# Patient Record
Sex: MALE | URBAN - METROPOLITAN AREA
[De-identification: names, ages, dates, MRNs, and addresses within clinical notes are randomized per-mention and may not be internally consistent; named-entity substitution may affect disease eponyms.]

---

## 2017-03-08 ENCOUNTER — TRANSFERRED RECORDS (OUTPATIENT)
Dept: HEALTH INFORMATION MANAGEMENT | Facility: CLINIC | Age: 57
End: 2017-03-08

## 2017-03-09 ENCOUNTER — HOSPITAL ENCOUNTER (OUTPATIENT)
Dept: MRI IMAGING | Facility: CLINIC | Age: 57
Discharge: HOME | End: 2017-03-09
Attending: FAMILY MEDICINE | Admitting: FAMILY MEDICINE
Payer: COMMERCIAL

## 2017-03-09 DIAGNOSIS — M25.461 EFFUSION, RIGHT KNEE: ICD-10-CM

## 2017-03-09 DIAGNOSIS — M25.561 RIGHT KNEE PAIN, UNSPECIFIED CHRONICITY: ICD-10-CM

## 2017-03-09 PROCEDURE — 73721 MRI JNT OF LWR EXTRE W/O DYE: CPT | Mod: RT

## 2017-03-13 ENCOUNTER — OFFICE VISIT (OUTPATIENT)
Dept: ORTHOPEDICS | Facility: CLINIC | Age: 57
End: 2017-03-13

## 2017-03-13 VITALS — BODY MASS INDEX: 28.71 KG/M2 | HEIGHT: 72 IN | WEIGHT: 212 LBS

## 2017-03-13 DIAGNOSIS — M25.561 ARTHRALGIA OF RIGHT LOWER LEG: Primary | ICD-10-CM

## 2017-03-13 RX ORDER — DICLOFENAC SODIUM 75 MG/1
75 TABLET, DELAYED RELEASE ORAL 2 TIMES DAILY PRN
Qty: 30 TABLET | Refills: 1 | Status: SHIPPED | OUTPATIENT
Start: 2017-03-13 | End: 2018-02-17

## 2017-03-13 NOTE — MR AVS SNAPSHOT
After Visit Summary   3/13/2017    Jose Antonio Brown    MRN: [id number]           Patient Information     Date Of Birth          1960        Visit Information        Provider Department      3/13/2017 8:30 AM Owen Malhotra MD [facility]        Today's Diagnoses     Arthralgia of right lower leg    -  1       Follow-ups after your visit        Your next 10 appointments already scheduled     Mar 27, 2017  8:50 AM CDT   (Arrive by 8:35 AM)   Return Visit with Owen Malhotra MD   [facility] ([facility] [place])    [address] [place] [address] [phone]              Who to contact     Please call your clinic at [phone] to:    Ask questions about your health    Make or cancel appointments    Discuss your medicines    Learn about your test results    Speak to your doctor   If you have compliments or concerns about an experience at your clinic, or if you wish to file a complaint, please contact [facility] Patient Relations at [phone] or email us at [email or web address]         Additional Information About Your Visit        MyChart Information     Nutrisystemt is an electronic gateway that provides easy, online access to your medical records. With Puuilo, you can request a clinic appointment, read your test results, renew a prescription or communicate with your care team.     To sign up for Nutrisystemt visit the website at [email or web address]   You will be asked to enter the access code listed below, as well as some personal information. Please follow the directions to create your username and password.     Your access code is: [email or web address]  Expires: 2017  9:28 AM     Your access code will  in 90 days. If you need help or a new code, please contact your [facility] or call [phone] for assistance.        Care EveryWhere ID      This is your Care EveryWhere ID. This could be used by other organizations to access your [facility] medical records  [id number]        Your Vitals Were     Height BMI (Body Mass Index)                6' (1.829 m) 28.75 kg/m2           Blood Pressure from Last 3 Encounters:   No data found for BP    Weight from Last 3 Encounters:   03/13/17 212 lb (96.2 kg)              Today, you had the following     No orders found for display         Today's Medication Changes          These changes are accurate as of: 3/13/17  9:28 AM.  If you have any questions, ask your nurse or doctor.               Start taking these medicines.        Dose/Directions    diclofenac 75 MG EC tablet   Commonly known as:  VOLTAREN   Used for:  Arthralgia of right lower leg   Started by:  Owen Malhotra MD        Dose:  75 mg   Take 1 tablet (75 mg) by mouth 2 times daily as needed for moderate pain   Quantity:  30 tablet   Refills:  1            Where to get your medicines      Some of these will need a paper prescription and others can be bought over the counter.  Ask your nurse if you have questions.     Bring a paper prescription for each of these medications     diclofenac 75 MG EC tablet                Primary Care Provider    None Specified       No primary provider on file.        Thank you!     Thank you for choosing [facility]  for your care. Our goal is always to provide you with excellent care. Hearing back from our patients is one way we can continue to improve our services. Please take a few minutes to complete the written survey that you may receive in the mail after your visit with us. Thank you!             Your Updated Medication List - Protect others around you: Learn how to safely use, store and throw away your medicines at www.disposemymeds.org.          This list is accurate as of: 3/13/17  9:28 AM.  Always use your most recent med list.                   Brand Name Dispense Instructions for use     diclofenac 75 MG EC tablet    VOLTAREN    30 tablet    Take 1 tablet (75 mg) by mouth 2 times daily as needed for moderate pain

## 2017-03-13 NOTE — PROGRESS NOTES
HISTORY OF PRESENT ILLNESS:  This is a 56-year-old male who is in today for an evaluation of his right knee.  The patient states that he started noticing right knee discomfort about a week and a half ago with no known mechanism.  He works as a .  He denies significant swelling.  States that it may have a little bit of swelling.  He denies any locking, catching or christiane giving way.  The patient's intake form is reviewed and appears to be accurate.  Signed off on and scanned in.        PHYSICAL EXAMINATION:  The patient's physical examination demonstrates that he has a right knee range of motion 0-140.  He has pain with hyperflexion.  Most of his pain is localized to the medial aspect knee.  He has stable varus and valgus stress at 0 and 30 degrees.  Negative anterior and posterior drawer.  Negative Lachman.  Negative pivot shift.  He has positive medial joint line tenderness.  No lateral joint line tenderness.  He is neurovascularly intact.  Pain with attempt at Mariah medially.  There is just a trace effusion.      IMAGING:  The patient is status post an MRI.  The patient's MRI demonstrates that he has intact ACL, PCL, MCL and posterolateral corner.  He does demonstrate a small right knee effusion.  He has a tendinosis of his right knee patellar tendon although he is not tender there today.  The patient has a horizontal signal within his right knee, medial meniscus with extension to the inferior articular surface at the level of the posterior horn.  No displaced fragment.  Mild free edge fraying of his lateral meniscus.  The patient's findings are reviewed with him in some detail.      ASSESSMENT:  My overall impression is that the patient has right knee pain and a medial meniscus tear.  He has had about a week and a half of symptoms.      PLAN:  At this point, I think it may be a little early to suggest a surgical intervention.  We talked about conservative management using an anti-inflammatory and he  will give this a try over the next 2 weeks and follow up with me at that time.  If he is still having problems at that time, consider arthroscopic intervention and possible partial meniscectomy versus repair.        This patient's office visit is 30 minutes of face-to-face time, of which 20 minutes was counseling.

## 2017-03-13 NOTE — LETTER
3/13/2017      RE: Jose Antonio Brown  [address]  [facility] [address]       HISTORY OF PRESENT ILLNESS:  This is a 56-year-old male who is in today for an evaluation of his right knee.  The patient states that he started noticing right knee discomfort about a week and a half ago with no known mechanism.  He works as a .  He denies significant swelling.  States that it may have a little bit of swelling.  He denies any locking, catching or christiane giving way.  The patient's intake form is reviewed and appears to be accurate.  Signed off on and scanned in.        PHYSICAL EXAMINATION:  The patient's physical examination demonstrates that he has a right knee range of motion 0-140.  He has pain with hyperflexion.  Most of his pain is localized to the medial aspect knee.  He has stable varus and valgus stress at 0 and 30 degrees.  Negative anterior and posterior drawer.  Negative Lachman.  Negative pivot shift.  He has positive medial joint line tenderness.  No lateral joint line tenderness.  He is neurovascularly intact.  Pain with attempt at Mariah medially.  There is just a trace effusion.      IMAGING:  The patient is status post an MRI.  The patient's MRI demonstrates that he has intact ACL, PCL, MCL and posterolateral corner.  He does demonstrate a small right knee effusion.  He has a tendinosis of his right knee patellar tendon although he is not tender there today.  The patient has a horizontal signal within his right knee, medial meniscus with extension to the inferior articular surface at the level of the posterior horn.  No displaced fragment.  Mild free edge fraying of his lateral meniscus.  The patient's findings are reviewed with him in some detail.      ASSESSMENT:  My overall impression is that the patient has right knee pain and a medial meniscus tear.  He has had about a week and a half of symptoms.      PLAN:  At this point, I think it may be a little early to suggest a  surgical intervention.  We talked about conservative management using an anti-inflammatory and he will give this a try over the next 2 weeks and follow up with me at that time.  If he is still having problems at that time, consider arthroscopic intervention and possible partial meniscectomy versus repair.        This patient's office visit is 30 minutes of face-to-face time, of which 20 minutes was counseling.     Owen Malhotra MD

## 2017-03-13 NOTE — NURSING NOTE
Reason For Visit:   Chief Complaint   Patient presents with     Consult     right knee injury       ?  No  Occupation .  Currently working? Yes.  Work status?  Full time.  Date of injury: started noticing pain about a week and a half ago, no known mechanism  Type of injury: NA.  Date of surgery: NA  Type of surgery: NA.  Smoker: No  Request smoking cessation information: No    No current outpatient prescriptions on file.     No current facility-administered medications for this visit.       No Known Allergies    Ht 6' (1.829 m)  Wt 212 lb (96.2 kg)  BMI 28.75 kg/m2    Ramonita Yusuf, ATC

## 2017-03-27 ENCOUNTER — OFFICE VISIT (OUTPATIENT)
Dept: ORTHOPEDICS | Facility: CLINIC | Age: 57
End: 2017-03-27

## 2017-03-27 VITALS — WEIGHT: 212 LBS | HEIGHT: 72 IN | BODY MASS INDEX: 28.71 KG/M2

## 2017-03-27 DIAGNOSIS — S83.241A ACUTE MEDIAL MENISCUS TEAR, RIGHT, INITIAL ENCOUNTER: Primary | ICD-10-CM

## 2017-03-27 NOTE — LETTER
3/27/2017      RE: Jose Antonio Borwn  [address]  [facility] [address]       HISTORY OF PRESENT ILLNESS:  He was last seen on 3/13/17 for right knee pain diagnosed as having a right knee medial meniscus tear.  He had an MRI which showed a little undersurface horizontal tear.  The rest of his joint actually was in very good shape.  No chondral surface injury.  No ligamentous injuries.  He has just started having right knee discomfort.  It was mostly medial side at that time.  We decided to treat him conservatively initially given that it had only been giving him trouble for about 2 weeks and placed him on an anti-inflammatory, Voltaren, had him continue his physical therapy and we would see him back today.  On his visit today the patient states that he actually feels pretty good.  He reports the pain as being a 1/10.  He is able do his daily activities without any problems.  He denies having recurrent swelling, no locking, catching or christiane giving way.  He admits that he is not doing any lower extremity stuff at the gym at this time.      PHYSICAL EXAMINATION:  He has full range of motion of his knee 0-140 with no pain with hyperflexion.  He is stable to varus and valgus stress at 0 and 30 degrees.  Negative anterior and posterior drawer, negative Lachman, negative pivot shift.  He has no lateral joint line tenderness and no medial joint line tenderness.  No tenderness over his medial hamstring at the time either.  He has no patellofemoral symptoms.  Negative patellar tilt.  Negative patellar glide.  He is otherwise neurovascularly intact.      IMPRESSION:  The impression is that the patient has right knee pain.  He has a medial meniscus tear.  It sounds at this point that things are stable.  Certainly he needs to increase his activity level to be sure that things are stable.  The recommendation is that he increases his activity level at the gym and he can start doing lower extremity work.  Should  symptoms return or he start having medial-sided knee pain and it appears that the meniscus is unstable then he is a candidate for a right knee arthroscopy.  We talked about this in some detail.  The risks and benefits of the procedure as well as rehabilitation protocol.  Should he start to have symptoms, he will give us a call and figure out a time a day for his arthroscopy as needed.     Owen Malhotra MD

## 2017-03-27 NOTE — NURSING NOTE
Reason For Visit:   Chief Complaint   Patient presents with     RECHECK     Follow up on right knee meniscus tear         Pain Assessment  Patient Currently in Pain: Yes  0-10 Pain Scale: 1  Pain Orientation: Right    Current Outpatient Prescriptions   Medication     diclofenac (VOLTAREN) 75 MG EC tablet     No current facility-administered medications for this visit.       No Known Allergies    Ramonita Yusuf, ATC

## 2017-03-27 NOTE — MR AVS SNAPSHOT
After Visit Summary   3/27/2017    Jose Antonio Brown    MRN: [id number]           Patient Information     Date Of Birth          1960        Visit Information        Provider Department      3/27/2017 9:20 AM Owen Malhotra MD [facility]        Today's Diagnoses     Acute medial meniscus tear, right, initial encounter    -  1       Follow-ups after your visit        Your next 10 appointments already scheduled     Apr 10, 2017  9:20 AM CDT   (Arrive by 9:05 AM)   Return Visit with Owen Malhotra MD   [facility] ([facility] [place])    [address] [place] [address]  [facility] [address] [phone]              Who to contact     Please call your clinic at [phone] to:    Ask questions about your health    Make or cancel appointments    Discuss your medicines    Learn about your test results    Speak to your doctor   If you have compliments or concerns about an experience at your clinic, or if you wish to file a complaint, please contact [facility] Patient Relations at [phone] or email us at [email or web address]         Additional Information About Your Visit        MyChart Information     Ubiquitous Energ[employer] is an electronic gateway that provides easy, online access to your medical records. With Schoology, you can request a clinic appointment, read your test results, renew a prescription or communicate with your care team.     To sign up for Ubiquitous Energyt visit the website at [email or web address]   You will be asked to enter the access code listed below, as well as some personal information. Please follow the directions to create your username and password.     Your access code is: [email or web address]  Expires: 2017  9:28 AM     Your access code will  in 90 days. If you need help or a new code, please contact your [facility] or call [phone] for assistance.         Care EveryWhere ID     This is your Care EveryWhere ID. This could be used by other organizations to access your [place] medical records  [id number]        Your Vitals Were     Height BMI (Body Mass Index)                1.829 m (6') 28.75 kg/m2           Blood Pressure from Last 3 Encounters:   No data found for BP    Weight from Last 3 Encounters:   03/27/17 96.2 kg (212 lb)   03/13/17 96.2 kg (212 lb)              Today, you had the following     No orders found for display       Primary Care Provider    None Specified       No primary provider on file.        Thank you!     Thank you for choosing [facility]  for your care. Our goal is always to provide you with excellent care. Hearing back from our patients is one way we can continue to improve our services. Please take a few minutes to complete the written survey that you may receive in the mail after your visit with us. Thank you!             Your Updated Medication List - Protect others around you: Learn how to safely use, store and throw away your medicines at www.disposemymeds.org.          This list is accurate as of: 3/27/17 11:59 PM.  Always use your most recent med list.                   Brand Name Dispense Instructions for use    diclofenac 75 MG EC tablet    VOLTAREN    30 tablet    Take 1 tablet (75 mg) by mouth 2 times daily as needed for moderate pain

## 2017-03-27 NOTE — PROGRESS NOTES
HISTORY OF PRESENT ILLNESS:  He was last seen on 3/13/17 for right knee pain diagnosed as having a right knee medial meniscus tear.  He had an MRI which showed a little undersurface horizontal tear.  The rest of his joint actually was in very good shape.  No chondral surface injury.  No ligamentous injuries.  He has just started having right knee discomfort.  It was mostly medial side at that time.  We decided to treat him conservatively initially given that it had only been giving him trouble for about 2 weeks and placed him on an anti-inflammatory, Voltaren, had him continue his physical therapy and we would see him back today.  On his visit today the patient states that he actually feels pretty good.  He reports the pain as being a 1/10.  He is able do his daily activities without any problems.  He denies having recurrent swelling, no locking, catching or christiane giving way.  He admits that he is not doing any lower extremity stuff at the gym at this time.      PHYSICAL EXAMINATION:  He has full range of motion of his knee 0-140 with no pain with hyperflexion.  He is stable to varus and valgus stress at 0 and 30 degrees.  Negative anterior and posterior drawer, negative Lachman, negative pivot shift.  He has no lateral joint line tenderness and no medial joint line tenderness.  No tenderness over his medial hamstring at the time either.  He has no patellofemoral symptoms.  Negative patellar tilt.  Negative patellar glide.  He is otherwise neurovascularly intact.      IMPRESSION:  The impression is that the patient has right knee pain.  He has a medial meniscus tear.  It sounds at this point that things are stable.  Certainly he needs to increase his activity level to be sure that things are stable.  The recommendation is that he increases his activity level at the gym and he can start doing lower extremity work.  Should symptoms return or he start having medial-sided knee pain and it appears that the meniscus is  unstable then he is a candidate for a right knee arthroscopy.  We talked about this in some detail.  The risks and benefits of the procedure as well as rehabilitation protocol.  Should he start to have symptoms, he will give us a call and figure out a time a day for his arthroscopy as needed.

## 2017-12-05 ENCOUNTER — OFFICE VISIT (OUTPATIENT)
Dept: ORTHOPEDICS | Facility: CLINIC | Age: 57
End: 2017-12-05

## 2017-12-05 VITALS
DIASTOLIC BLOOD PRESSURE: 75 MMHG | WEIGHT: 209.4 LBS | SYSTOLIC BLOOD PRESSURE: 129 MMHG | BODY MASS INDEX: 28.36 KG/M2 | HEIGHT: 72 IN | HEART RATE: 59 BPM

## 2017-12-05 DIAGNOSIS — M25.562 ACUTE PAIN OF LEFT KNEE: ICD-10-CM

## 2017-12-05 DIAGNOSIS — M25.462 KNEE EFFUSION, LEFT: Primary | ICD-10-CM

## 2017-12-05 RX ORDER — DICLOFENAC SODIUM 75 MG/1
75 TABLET, DELAYED RELEASE ORAL 2 TIMES DAILY
Qty: 14 TABLET | Refills: 0 | Status: SHIPPED | OUTPATIENT
Start: 2017-12-05 | End: 2018-02-17

## 2017-12-05 NOTE — MR AVS SNAPSHOT
After Visit Summary   12/5/2017    Jose Antonio Brown    MRN: [id number]           Patient Information     Date Of Birth          1960        Visit Information        Provider Department      12/5/2017 12:50 PM Natalio Hall [facility]        Today's Diagnoses     Knee effusion, left    -  1      Care Instructions    Knee Exercises (Meniscal tear)    You may do the first 7 exercises right away. You may do the rest of the exercises when the pain in your knee has decreased.    Passive knee extension: Do this exercise if you are unable to extend your knee fully. While lying on your back, place a rolled-up towel under the heel of your injured leg so the heel is about 6 inches off the ground. Relax your leg muscles and let gravity slowly straighten your knee. Try to hold this position for 2 minutes. Repeat 3 times. You may feel some discomfort while doing this exercise. Do the exercise several times a day.  This exercise can also be done while sitting in a chair with your heel on another chair or stool.    Heel slide: Sit on a firm surface with your legs straight in front of you. Slowly slide the heel of the foot on your injured side toward your buttock by pulling your knee toward your chest as you slide the heel. Return to the starting position. Do 2 sets of 15.  Standing calf stretch: Stand facing a wall with your hands on the wall at about eye level. Keep your injured leg back with your heel on the floor. Keep the other leg forward with the knee bent. Turn your back foot slightly inward (as if you were pigeon-toed). Slowly lean into the wall until you feel a stretch in the back of your calf. Hold the stretch for 15 to 30 seconds. Return to the starting position. Repeat 3 times. Do this exercise several times each day.    Hamstring stretch on wall: Lie on your back with your buttocks close to a doorway. Stretch your uninjured leg straight out in front of you on  the floor through the doorway. Raise your injured leg and rest it against the wall next to the door frame. Keep your leg as straight as possible. You should feel a stretch in the back of your thigh. Hold this position for 15 to 30 seconds. Repeat 3 times.  Straight leg raise: Lie on your back with your legs straight out in front of you. Bend the knee on your uninjured side and place the foot flat on the floor. Tighten the thigh muscle on your injured side and lift your leg about 8 inches off the floor. Keep your leg straight and your thigh muscle tight. Slowly lower your leg back down to the floor. Do 2 sets of 15.    Prone hip extension: Lie on your stomach with your legs straight out behind you. Fold your arms under your head and rest your head on your arms. Draw your belly button in towards your spine and tighten your abdominal muscles. Tighten the buttocks and thigh muscles of the leg on your injured side and lift the leg off the floor about 8 inches. Keep your leg straight. Hold for 5 seconds. Then lower your leg and relax. Do 2 sets of 15.  Clam exercise: Lie on your uninjured side with your hips and knees bent and feet together. Slowly raise your top leg toward the ceiling while keeping your heels touching each other. Hold for 2 seconds and lower slowly. Do 2 sets of 15 repetitions.    Wall squat with a ball: Stand with your back, shoulders, and head against a wall. Look straight ahead. Keep your shoulders relaxed and your feet 3 feet (90 centimeters) from the wall and shoulder's width apart. Place a soccer or basketball-sized ball behind your back. Keeping your back against the wall, slowly squat down to a 45-degree angle. Your thighs will not yet be parallel to the floor. Hold this position for 10 seconds and then slowly slide back up the wall. Repeat 10 times. Build up to 2 sets of 15.  Step-up: Stand with the foot of your injured leg on a support 3 to 5 inches (8 to 13 centimeters) high --like a small  step or block of wood. Keep your other foot flat on the floor. Shift your weight onto the injured leg on the support. Straighten your injured leg as the other leg comes off the floor. Return to the starting position by bending your injured leg and slowly lowering your uninjured leg back to the floor. Do 2 sets of 15.    Knee stabilization: Wrap a piece of elastic tubing around the ankle of your uninjured leg. Tie a knot in the other end of the tubing and close it in a door at about ankle height.  Stand facing the door on the leg without tubing (your injured leg) and bend your knee slightly, keeping your thigh muscles tight. Stay in this position while you move the leg with the tubing (the uninjured leg) straight back behind you. Do 2 sets of 15.  Turn 90 degrees so the leg without tubing is closest to the door. Move the leg with tubing away from your body. Do 2 sets of 15.  Turn 90 degrees again so your back is to the door. Move the leg with tubing straight out in front of you. Do 2 sets of 15.  Turn your body 90 degrees again so the leg with tubing is closest to the door. Move the leg with tubing across your body. Do 2 sets of 15.  Hold onto a chair if you need help balancing. This exercise can be made more challenging by standing on a firm pillow or foam mat while you move the leg with tubing.    Resisted terminal knee extension: Make a loop with a piece of elastic tubing by tying a knot in both ends. Close the knot in a door at knee height. Step into the loop with your injured leg so the tubing is around the back of your knee. Lift the other foot off the ground and hold onto a chair for balance, if needed. Bend the knee with tubing about 45 degrees. Slowly straighten your leg, keeping your thigh muscle tight as you do this. Repeat 15 times. Do 2 sets of 15. If you need an easier way to do this, stand on both legs for better support while you do the exercise.  If you have access to a [employer] board, do the following  exercises:            Developed by [employer].  Published by [employer].  Copyright  2014 [employer] and/or one of its subsidiaries. All rights reserved.          Follow-ups after your visit        Who to contact     Please call your clinic at [phone] to:    Ask questions about your health    Make or cancel appointments    Discuss your medicines    Learn about your test results    Speak to your doctor   If you have compliments or concerns about an experience at your clinic, or if you wish to file a complaint, please contact [facility] Patient Relations at [phone] or email us at [email or web address]         Additional Information About Your Visit        [employer] Information     tracx is an electronic gateway that provides easy, online access to your medical records. With tracx, you can request a clinic appointment, read your test results, renew a prescription or communicate with your care team.     To sign up for tracx visit the website at [email or web address]   You will be asked to enter the access code listed below, as well as some personal information. Please follow the directions to create your username and password.     Your access code is: [email or web address]  Expires: 3/5/2018  2:04 PM     Your access code will  in 90 days. If you need help or a new code, please contact your [facility] or call [phone] for assistance.        Care EveryWhere ID     This is your Care EveryWhere ID. This could be used by other organizations to access your [facility] medical records  [id number]        Your Vitals Were     Pulse Height BMI (Body Mass Index)             59 6' (1.829 m) 28.4 kg/m2          Blood Pressure from Last 3 Encounters:   17 129/75    Weight from Last 3 Encounters:   17 209 lb 6.4 oz (95 kg)   17 212 lb (96.2 kg)   17 212 lb (96.2 kg)                 Today's Medication Changes           These changes are accurate as of: 12/5/17  2:04 PM.  If you have any questions, ask your nurse or doctor.               These medicines have changed or have updated prescriptions.        Dose/Directions    * diclofenac 75 MG EC tablet   Commonly known as:  VOLTAREN   This may have changed:  Another medication with the same name was added. Make sure you understand how and when to take each.   Used for:  Arthralgia of right lower leg   Changed by:  Owen Malhotra MD        Dose:  75 mg   Take 1 tablet (75 mg) by mouth 2 times daily as needed for moderate pain   Quantity:  30 tablet   Refills:  1       * diclofenac 75 MG EC tablet   Commonly known as:  VOLTAREN   This may have changed:  You were already taking a medication with the same name, and this prescription was added. Make sure you understand how and when to take each.   Used for:  Knee effusion, left   Changed by:  Natalio Hall DO        Dose:  75 mg   Take 1 tablet (75 mg) by mouth 2 times daily   Quantity:  14 tablet   Refills:  0       * Notice:  This list has 2 medication(s) that are the same as other medications prescribed for you. Read the directions carefully, and ask your doctor or other care provider to review them with you.         Where to get your medicines      These medications were sent to [facility] #[id number] [address] [address]     Phone:  [phone]     diclofenac 75 MG EC tablet                Primary Care Provider    None Specified       No primary provider on file.        Equal Access to Services     BAKARI AVALOS : Kira Harper, gwendolyn lr, qamarcel johnson. So [facility] [phone].    ATENCIÓN: Si habla español, tiene a mcintyre disposición servicios gratuitos de asistencia lingüística. Nicolas al [phone].    We comply with applicable federal civil rights laws and [place] laws. We do not  discriminate on the basis of race, color, national origin, age, disability, sex, sexual orientation, or gender identity.            Thank you!     Thank you for choosing [facility]  for your care. Our goal is always to provide you with excellent care. Hearing back from our patients is one way we can continue to improve our services. Please take a few minutes to complete the written survey that you may receive in the mail after your visit with us. Thank you!             Your Updated Medication List - Protect others around you: Learn how to safely use, store and throw away your medicines at www.disposemymeds.org.          This list is accurate as of: 12/5/17  2:04 PM.  Always use your most recent med list.                   Brand Name Dispense Instructions for use Diagnosis    * diclofenac 75 MG EC tablet    VOLTAREN    30 tablet    Take 1 tablet (75 mg) by mouth 2 times daily as needed for moderate pain    Arthralgia of right lower leg       * diclofenac 75 MG EC tablet    VOLTAREN    14 tablet    Take 1 tablet (75 mg) by mouth 2 times daily    Knee effusion, left       * Notice:  This list has 2 medication(s) that are the same as other medications prescribed for you. Read the directions carefully, and ask your doctor or other care provider to review them with you.

## 2017-12-05 NOTE — PATIENT INSTRUCTIONS
Knee Exercises (Meniscal tear)    You may do the first 7 exercises right away. You may do the rest of the exercises when the pain in your knee has decreased.    Passive knee extension: Do this exercise if you are unable to extend your knee fully. While lying on your back, place a rolled-up towel under the heel of your injured leg so the heel is about 6 inches off the ground. Relax your leg muscles and let gravity slowly straighten your knee. Try to hold this position for 2 minutes. Repeat 3 times. You may feel some discomfort while doing this exercise. Do the exercise several times a day.  This exercise can also be done while sitting in a chair with your heel on another chair or stool.    Heel slide: Sit on a firm surface with your legs straight in front of you. Slowly slide the heel of the foot on your injured side toward your buttock by pulling your knee toward your chest as you slide the heel. Return to the starting position. Do 2 sets of 15.  Standing calf stretch: Stand facing a wall with your hands on the wall at about eye level. Keep your injured leg back with your heel on the floor. Keep the other leg forward with the knee bent. Turn your back foot slightly inward (as if you were pigeon-toed). Slowly lean into the wall until you feel a stretch in the back of your calf. Hold the stretch for 15 to 30 seconds. Return to the starting position. Repeat 3 times. Do this exercise several times each day.    Hamstring stretch on wall: Lie on your back with your buttocks close to a doorway. Stretch your uninjured leg straight out in front of you on the floor through the doorway. Raise your injured leg and rest it against the wall next to the door frame. Keep your leg as straight as possible. You should feel a stretch in the back of your thigh. Hold this position for 15 to 30 seconds. Repeat 3 times.  Straight leg raise: Lie on your back with your legs straight out in front of you. Bend the knee on your uninjured side  and place the foot flat on the floor. Tighten the thigh muscle on your injured side and lift your leg about 8 inches off the floor. Keep your leg straight and your thigh muscle tight. Slowly lower your leg back down to the floor. Do 2 sets of 15.    Prone hip extension: Lie on your stomach with your legs straight out behind you. Fold your arms under your head and rest your head on your arms. Draw your belly button in towards your spine and tighten your abdominal muscles. Tighten the buttocks and thigh muscles of the leg on your injured side and lift the leg off the floor about 8 inches. Keep your leg straight. Hold for 5 seconds. Then lower your leg and relax. Do 2 sets of 15.  Clam exercise: Lie on your uninjured side with your hips and knees bent and feet together. Slowly raise your top leg toward the ceiling while keeping your heels touching each other. Hold for 2 seconds and lower slowly. Do 2 sets of 15 repetitions.    Wall squat with a ball: Stand with your back, shoulders, and head against a wall. Look straight ahead. Keep your shoulders relaxed and your feet 3 feet (90 centimeters) from the wall and shoulder's width apart. Place a soccer or basketball-sized ball behind your back. Keeping your back against the wall, slowly squat down to a 45-degree angle. Your thighs will not yet be parallel to the floor. Hold this position for 10 seconds and then slowly slide back up the wall. Repeat 10 times. Build up to 2 sets of 15.  Step-up: Stand with the foot of your injured leg on a support 3 to 5 inches (8 to 13 centimeters) high --like a small step or block of wood. Keep your other foot flat on the floor. Shift your weight onto the injured leg on the support. Straighten your injured leg as the other leg comes off the floor. Return to the starting position by bending your injured leg and slowly lowering your uninjured leg back to the floor. Do 2 sets of 15.    Knee stabilization: Wrap a piece of elastic tubing around  the ankle of your uninjured leg. Tie a knot in the other end of the tubing and close it in a door at about ankle height.  Stand facing the door on the leg without tubing (your injured leg) and bend your knee slightly, keeping your thigh muscles tight. Stay in this position while you move the leg with the tubing (the uninjured leg) straight back behind you. Do 2 sets of 15.  Turn 90 degrees so the leg without tubing is closest to the door. Move the leg with tubing away from your body. Do 2 sets of 15.  Turn 90 degrees again so your back is to the door. Move the leg with tubing straight out in front of you. Do 2 sets of 15.  Turn your body 90 degrees again so the leg with tubing is closest to the door. Move the leg with tubing across your body. Do 2 sets of 15.  Hold onto a chair if you need help balancing. This exercise can be made more challenging by standing on a firm pillow or foam mat while you move the leg with tubing.    Resisted terminal knee extension: Make a loop with a piece of elastic tubing by tying a knot in both ends. Close the knot in a door at knee height. Step into the loop with your injured leg so the tubing is around the back of your knee. Lift the other foot off the ground and hold onto a chair for balance, if needed. Bend the knee with tubing about 45 degrees. Slowly straighten your leg, keeping your thigh muscle tight as you do this. Repeat 15 times. Do 2 sets of 15. If you need an easier way to do this, stand on both legs for better support while you do the exercise.  If you have access to a wobble board, do the following exercises:            Developed by [employer].  Published by [employer].  Copyright  2014 [employer] and/or one of its subsidiaries. All rights reserved.

## 2017-12-05 NOTE — LETTER
12/5/2017       RE: Jose Antonio Brown  [address] [place] [id number]     Dear Colleague,    Thank you for referring your patient, Jose Antonio Brown, to the [facility] at [facility]. Please see a copy of my visit note below.    CHIEF COMPLAINT:  Consult (Left knee pain and swelling)       HISTORY OF PRESENT ILLNESS  Mr. Brown is a pleasant 57 year old year old male who presents to clinic today with left knee pain.  Jose Antonio explains that stiffness of left knee began about 5 days ago.  No inciting event.  He noted pain with stairs at that time.  Swelling of knee about 3 days ago.  Pain is mild, anterior.  Has not tried NSAIDS.  Tried ice.     Additional history:  Right meniscal tear treated conservatively.     MEDICAL HISTORY  There is no problem list on file for this patient.      Current Outpatient Prescriptions   Medication Sig Dispense Refill     diclofenac (VOLTAREN) 75 MG EC tablet Take 1 tablet (75 mg) by mouth 2 times daily as needed for moderate pain (Patient not taking: Reported on 12/5/2017) 30 tablet 1       No Known Allergies    No family history on file.    Additional medical/Social/Surgical histories [facility] and updated as appropriate.     REVIEW OF SYSTEMS (12/5/2017)  CONSTITUTIONAL: Denies fever and weight loss  EYES: Denies acute vision changes  ENT: Denies hearing changes or difficulty swallowing  CARDIAC: Denies chest pain or edema  RESPIRATORY: Denies dyspnea, cough or wheeze  GASTROINTESTINAL: Denies abdominal pain, nausea, vomiting  MUSCULOSKELETAL: See HPI  SKIN: Denies any recent rash or lesion  NEUROLOGICAL: Denies numbness or focal weakness  PSYCHIATRIC: No history of psychiatric symptoms or problems  ENDOCRINE: Denies current diagnosis of diabetes  HEMATOLOGY: Denies episodes of easy bleeding      PHYSICAL EXAM  /75 (BP Location: Left arm, Patient Position: Sitting, Cuff Size:  Adult Regular)  Pulse 59  Ht 6' (1.829 m)  Wt 209 lb 6.4 oz (95 kg)  BMI 28.4 kg/m2      General Appearance: Well appearing, alert, in no acute distress, well-hydrated, and well nourished  Cardiovascular: no signs of upper or lower extremity edema  Respiratory: no respiratory distress, no audible wheezing, no labored breathing, symmetric thoracic excursion  Psychiatric: mood and affect are appropriate, patient is oriented to time, place and person  Musculoskeletal - Left knee  - stance: normal gait without limp, no obvious leg length discrepancy  - inspection:moderate effusion, no ecchymosis, normal muscle tone, normal bone and joint alignment, no obvious deformity  - palpation: No medial or lateral joint line tenderness, patella and patellar tendon non-tender, normal popliteal pulse  - ROM: 110 degrees flexion, -5 degrees extension, pain at terminal extension passively, tightness with flexion.  - strength: 5/5 in flexion, 5/5 in extension  - neuro: no sensory or motor deficit  - special tests:  (-) Lachman  (-) anterior drawer  (-) posterior drawer  (-) Mariah  (-) varus at 0 and 30 degrees flexion  (-) valgus at 0 and 30 degrees flexion  (-) Mirza s compression test  Neuro  - no sensory or motor deficit, grossly normal coordination, normal muscle tone  Skin  - no ecchymosis, no erythema, warmth, or induration, no obvious rash  Psych  - interactive, appropriate, normal mood and affect    IMAGING : XR knee. Final results and radiologist's interpretation, available in the [facility] health record. Images were reviewed with the patient/family members in the office today. My personal interpretation of the performed imaging is no acute osseous abnormality.  No joint space narrowing.     ASSESSMENT & PLAN  Mr. Brown is a 57 year old year old male who presents to clinic today with right knee effusion and mild pain over the last 5 days.  XR without abnormality and examination is rather nonspecific.  Most likely etiology  given level of moderate effusion is meniscal tear vs. Less likely OCD.  Treatment options discussed at length including most conservative to aspiration and injection.  Patient opted for initial conservative course.     Diagnosis:   Left knee effusion  Left knee pain    -Tubigrip compression  -Ice to knee  -Voltaren BID x 7d  -Elevation  -Home exercise program for meniscal tearing  -Follow up 2 weeks if swelling persists; consider injection vs. MRI    It was a pleasure seeing Jose Antonio today.    Natalio Hall DO, CAQSM  Primary Care Sports Medicine

## 2017-12-05 NOTE — PROGRESS NOTES
CHIEF COMPLAINT:  Consult (Left knee pain and swelling)       HISTORY OF PRESENT ILLNESS  Mr. Brown is a pleasant 57 year old year old male who presents to clinic today with left knee pain.  Jose Antonio explains that stiffness of left knee began about 5 days ago.  No inciting event.  He noted pain with stairs at that time.  Swelling of knee about 3 days ago.  Pain is mild, anterior.  Has not tried NSAIDS.  Tried ice.     Additional history:  Right meniscal tear treated conservatively.     MEDICAL HISTORY  There is no problem list on file for this patient.      Current Outpatient Prescriptions   Medication Sig Dispense Refill     diclofenac (VOLTAREN) 75 MG EC tablet Take 1 tablet (75 mg) by mouth 2 times daily as needed for moderate pain (Patient not taking: Reported on 12/5/2017) 30 tablet 1       No Known Allergies    No family history on file.    Additional medical/Social/Surgical histories reviewed in [facility] and updated as appropriate.     REVIEW OF SYSTEMS (12/5/2017)  CONSTITUTIONAL: Denies fever and weight loss  EYES: Denies acute vision changes  ENT: Denies hearing changes or difficulty swallowing  CARDIAC: Denies chest pain or edema  RESPIRATORY: Denies dyspnea, cough or wheeze  GASTROINTESTINAL: Denies abdominal pain, nausea, vomiting  MUSCULOSKELETAL: See HPI  SKIN: Denies any recent rash or lesion  NEUROLOGICAL: Denies numbness or focal weakness  PSYCHIATRIC: No history of psychiatric symptoms or problems  ENDOCRINE: Denies current diagnosis of diabetes  HEMATOLOGY: Denies episodes of easy bleeding      PHYSICAL EXAM  /75 (BP Location: Left arm, Patient Position: Sitting, Cuff Size: Adult Regular)  Pulse 59  Ht 6' (1.829 m)  Wt 209 lb 6.4 oz (95 kg)  BMI 28.4 kg/m2      General Appearance: Well appearing, alert, in no acute distress, well-hydrated, and well nourished  Cardiovascular: no signs of upper or lower extremity edema  Respiratory: no respiratory distress, no audible wheezing, no labored  breathing, symmetric thoracic excursion  Psychiatric: mood and affect are appropriate, patient is oriented to time, place and person  Musculoskeletal - Left knee  - stance: normal gait without limp, no obvious leg length discrepancy  - inspection:moderate effusion, no ecchymosis, normal muscle tone, normal bone and joint alignment, no obvious deformity  - palpation: No medial or lateral joint line tenderness, patella and patellar tendon non-tender, normal popliteal pulse  - ROM: 110 degrees flexion, -5 degrees extension, pain at terminal extension passively, tightness with flexion.  - strength: 5/5 in flexion, 5/5 in extension  - neuro: no sensory or motor deficit  - special tests:  (-) Lachman  (-) anterior drawer  (-) posterior drawer  (-) Mariah  (-) varus at 0 and 30 degrees flexion  (-) valgus at 0 and 30 degrees flexion  (-) Mirza s compression test  Neuro  - no sensory or motor deficit, grossly normal coordination, normal muscle tone  Skin  - no ecchymosis, no erythema, warmth, or induration, no obvious rash  Psych  - interactive, appropriate, normal mood and affect    IMAGING : XR knee. Final results and radiologist's interpretation, available in the [facility] health record. Images were reviewed with the patient/family members in the office today. My personal interpretation of the performed imaging is no acute osseous abnormality.  No joint space narrowing.     ASSESSMENT & PLAN  Mr. Brown is a 57 year old year old male who presents to clinic today with left knee effusion and mild pain over the last 5 days.  XR without abnormality and examination is rather nonspecific.  Most likely etiology given level of moderate effusion is meniscal tear vs. Less likely OCD.  Treatment options discussed at length including most conservative to aspiration and injection.  Patient opted for initial conservative course.     Diagnosis:   Left knee effusion  Left knee pain    -Tubigrip compression  -Ice to knee  -Voltaren BID x  7d  -Elevation  -Home exercise program for meniscal tearing  -Follow up 2 weeks if swelling persists; consider injection vs. MRI    It was a pleasure seeing Jose Antonio today.    Natalio Hall DO, CAQSM  Primary Care Sports Medicine

## 2017-12-05 NOTE — NURSING NOTE
NEW PATIENT INTAKE QUESTIONNAIRE  SPORTS & ORTHOPEDIC WALK-IN 12/5/2017    Primary Care Physician: None    Where are the majority of your medical records? [facility]    Insious onset of left knee swelling, pain, and limited ROM. Previous history of right knee pain seen by Dr. Malhotra.     Reason for Visit:    What part of your body is injured / painful?  left knee    What caused the injury /pain? No inciting event     How long ago did your injury occur or pain begin? several days ago    What are your most bothersome symptoms? Pain and swelling    How would you characterize your symptom? sudden and worsening    What makes your symptoms better? Nothing    What makes your symptoms worse? Other: Going up and down the stairs    Have you been previously seen for this problem? Yes, Physician at [facility]    Medical History:    Medical History: None    Have you had surgery on this body part before? No    Medications: None    Allergies: No known drug allergies    Family History of Medical Problems:     Previous Surgeries: None    Social History:    Occupation:     Handedness: Right    Exercise: Cardiovascular: Running, stair master, weights, stretching once/week    Review of Systems:    Have you recently had a a fever, chills, weight loss? No    Do you have any vision problems? No    Do you have any chest pain or edema? Yes, swelling in knee    Do you have any shortness of breath or wheezing?  No    Do you have stomach problems? No    Do you have any numbness or focal weakness? No    Do you have diabetes? No    Do you have problems with bleeding or clotting? No    Do you have an rashes or other skin lesions? No    Communication:    How did you hear about us? My doctor referred me, [facility]    Who else should know about this visit? None

## 2017-12-15 ENCOUNTER — OFFICE VISIT (OUTPATIENT)
Dept: ORTHOPEDICS | Facility: CLINIC | Age: 57
End: 2017-12-15
Payer: COMMERCIAL

## 2017-12-15 VITALS — WEIGHT: 209 LBS | HEIGHT: 72 IN | BODY MASS INDEX: 28.31 KG/M2

## 2017-12-15 DIAGNOSIS — M17.12 PRIMARY OSTEOARTHRITIS OF LEFT KNEE: ICD-10-CM

## 2017-12-15 DIAGNOSIS — M25.562 ACUTE PAIN OF LEFT KNEE: ICD-10-CM

## 2017-12-15 DIAGNOSIS — M25.462 EFFUSION, LEFT KNEE: Primary | ICD-10-CM

## 2017-12-15 RX ORDER — TRIAMCINOLONE ACETONIDE 40 MG/ML
40 INJECTION, SUSPENSION INTRA-ARTICULAR; INTRAMUSCULAR ONCE
Qty: 1 ML | Refills: 0 | OUTPATIENT
Start: 2017-12-15 | End: 2017-12-15

## 2017-12-15 NOTE — PROGRESS NOTES
SPORTS & ORTHOPEDIC WALK-IN FOLLOW-UP VISIT 12/15/2017    Interval History:     Follow up reason: Left knee effusion    Date of injury: 2.5 weeks    Date last seen: 12/5/17    Following Therapeutic Plan: Yes     Pain: Worsening    Function: Worsening    Interval History:      Medical History:    Any recent changes to your medical history? No    Any new medication prescribed since last visit? No    Review of Systems:    Do you have fever, chills, weight loss? No    Do you have any vision problems? No    Do you have any chest pain or edema? No    Do you have any shortness of breath or wheezing?  No    Do you have stomach problems? No    Do you have any numbness or focal weakness? No    Do you have diabetes? No    Do you have problems with bleeding or clotting? No    Do you have an rashes or other skin lesions? No

## 2017-12-15 NOTE — NURSING NOTE
[address] [address]  Dept: [phone]  ______________________________________________________________________________    Patient: Jose Antonio Brown   : 1960   MRN: [id number]   December 15, 2017    INVASIVE PROCEDURE SAFETY CHECKLIST    Date: 12/15/2017   Procedure: Left knee aspiration and Kenalog CSI  Patient Name: Jose Antonio Brown  MRN: [id number]  YOB: 1960    Action: Complete sections as appropriate. Any discrepancy results in a HARD COPY until resolved.     PRE PROCEDURE:  Patient ID verified with 2 identifiers (name and  or MRN): Yes  Procedure and site verified with patient/designee (when able): Yes  Accurate consent documentation in medical record: Yes  H&P (or appropriate assessment) documented in medical record: Yes  H&P must be up to 20 days prior to procedure and updates within 24 hours of procedure as applicable: Yes  Relevant diagnostic and radiology test results appropriately labeled and displayed as applicable: Yes  Procedure site(s) marked with provider initials: NA    TIMEOUT:  Time-Out performed immediately prior to starting procedure, including verbal and active participation of all team members addressing the following:Yes  * Correct patient identify  * Confirmed that the correct side and site are marked  * An accurate procedure consent form  * Agreement on the procedure to be done  * Correct patient position  * Relevant images and results are properly labeled and appropriately displayed  * The need to administer antibiotics or fluids for irrigation purposes during the procedure as applicable   * Safety precautions based on patient history or medication use    DURING PROCEDURE: Verification of correct person, site, and procedures any time the responsibility for care of the patient is transferred to another member of the care team.     The following medication was given:     MEDICATION:  Kenalog 40  mg  ROUTE: IA  SITE: Left knee  DOSE: 1cc  LOT #: [id number]  : Anctu  EXPIRATION DATE: 04/2019  NDC#: 0603-5338-22   Was there drug waste? No    MEDICATION:  Lidocaine without epinephrine  ROUTE: IA  SITE: Left knee  DOSE: 3cc (aspiration) 4cc (injection)  LOT #: [id number]  : AdverseEvents  EXPIRATION DATE: 09/21   NDC#: 84984-728-45   Was there drug waste? Yes  Amount of drug waste (mL): 13.  Reason for waste:  Single use vial  Maribeth Archer, ATC  December 15, 2017

## 2017-12-15 NOTE — MR AVS SNAPSHOT
After Visit Summary   12/15/2017    Jose Antonio Brown    MRN: [id number]           Patient Information     Date Of Birth          1960        Visit Information        Provider Department      12/15/2017 11:00 AM Natalio Hall DO M [facility]        Today's Diagnoses     Effusion, left knee    -  1    Acute pain of left knee        Primary osteoarthritis of left knee           Follow-ups after your visit        Additional Services     PHYSICAL THERAPY REFERRAL (Internal)       Physical Therapy Referral.  ROM, strengthening protocol for meniscal tear in setting of OA.                  Follow-up notes from your care team     Return in about 4 weeks (around 1/12/2018).      Your next 10 appointments already scheduled     Dec 18, 2017 12:10 PM CST   (Arrive by 11:55 AM)   [facility] Extremity with Ana Braswell, PT   [facility] ([facility]  )    [address]  [facility] [id number] [phone]            Jan 12, 2018  1:30 PM CST   Return Walk In Ortho with DO MARY Sahni [facility] ([facility])    [address] [address] [phone]              Who to contact     Please call your clinic at [phone] to:    Ask questions about your health    Make or cancel appointments    Discuss your medicines    Learn about your test results    Speak to your doctor   If you have compliments or concerns about an experience at your clinic, or if you wish to file a complaint, please contact [facility] Patient Relations at [phone] or email us at [email or web address]         Additional Information About Your Visit        MyChart Information     [employer] is an electronic gateway that provides easy, online access to your medical records. With [employer], you can request a clinic  appointment, read your test results, renew a prescription or communicate with your care team.     To sign up for [employer] visit the website at [email or web address]   You will be asked to enter the access code listed below, as well as some personal information. Please follow the directions to create your username and password.     Your access code is: [email or web address]  Expires: 3/5/2018  2:04 PM     Your access code will  in 90 days. If you need help or a new code, please contact your [facility] or call [phone] for assistance.        Care EveryWhere ID     This is your Care EveryWhere ID. This could be used by other organizations to access your [facility] medical records  [id number]        Your Vitals Were     Height BMI (Body Mass Index)                1.829 m (6') 28.35 kg/m2           Blood Pressure from Last 3 Encounters:   17 129/75    Weight from Last 3 Encounters:   12/15/17 94.8 kg (209 lb)   17 95 kg (209 lb 6.4 oz)   17 96.2 kg (212 lb)              We Performed the Following     Large Joint/Bursa injection and/or drainage - Unilateral (Shoulder, Knee) [04309]     PHYSICAL THERAPY REFERRAL (Internal)     TRIAMCINOLONE ACET INJ NOS          Today's Medication Changes          These changes are accurate as of: 12/15/17  7:02 PM.  If you have any questions, ask your nurse or doctor.               Start taking these medicines.        Dose/Directions    triamcinolone acetonide 40 MG/ML injection   Commonly known as:  KENALOG   Used for:  Primary osteoarthritis of left knee        Dose:  40 mg   1 mL (40 mg) by INTRA-ARTICULAR route once for 1 dose   Quantity:  1 mL   Refills:  0            Where to get your medicines      Some of these will need a paper prescription and others can be bought over the counter.  Ask your nurse if you have questions.     You don't need a prescription for these medications     triamcinolone acetonide 40 MG/ML injection                 Primary Care Provider    None Specified       No primary provider on file.        Equal Access to Services     BAKARI AVALOS : Hadii aad ku hadbrandonmeggan Harper, wagastonbarbara lr, azaragustin sotomamarcel silva. So [facility] [phone].    ATENCIÓN: Si habla español, tiene a mcintyre disposición servicios gratuitos de asistencia lingüística. [facility] [phone].    We comply with applicable federal civil rights laws and [place] laws. We do not discriminate on the basis of race, color, national origin, age, disability, sex, sexual orientation, or gender identity.            Thank you!     Thank you for choosing [facility] SPORTS AND ORTHOPAEDIC WALK IN CLINIC  for your care. Our goal is always to provide you with excellent care. Hearing back from our patients is one way we can continue to improve our services. Please take a few minutes to complete the written survey that you may receive in the mail after your visit with us. Thank you!             Your Updated Medication List - Protect others around you: Learn how to safely use, store and throw away your medicines at www.disposemymeds.org.          This list is accurate as of: 12/15/17  7:02 PM.  Always use your most recent med list.                   Brand Name Dispense Instructions for use Diagnosis    * diclofenac 75 MG EC tablet    VOLTAREN    30 tablet    Take 1 tablet (75 mg) by mouth 2 times daily as needed for moderate pain    Arthralgia of right lower leg       * diclofenac 75 MG EC tablet    VOLTAREN    14 tablet    Take 1 tablet (75 mg) by mouth 2 times daily    Knee effusion, left       triamcinolone acetonide 40 MG/ML injection    KENALOG    1 mL    1 mL (40 mg) by INTRA-ARTICULAR route once for 1 dose    Primary osteoarthritis of left knee       * Notice:  This list has 2 medication(s) that are the same as other medications prescribed for you. Read the directions carefully, and ask your doctor or other care provider  to review them with you.

## 2017-12-15 NOTE — LETTER
12/15/2017       RE: Jose Antonio Brown  [address] [place] [id number]     Dear Colleague,    Thank you for referring your patient, Jose Antonio Brown, to the [facility] SPORTS AND ORTHOPAEDIC WALK IN CLINIC at [facility]. Please see a copy of my visit note below.          SPORTS & ORTHOPEDIC WALK-IN FOLLOW-UP VISIT 12/15/2017    Interval History:     Follow up reason: Left knee effusion    Date of injury: 2.5 weeks    Date last seen: 12/5/17    Following Therapeutic Plan: Yes     Pain: Worsening    Function: Worsening    Interval History:      Medical History:    Any recent changes to your medical history? No    Any new medication prescribed since last visit? No    Review of Systems:    Do you have fever, chills, weight loss? No    Do you have any vision problems? No    Do you have any chest pain or edema? No    Do you have any shortness of breath or wheezing?  No    Do you have stomach problems? No    Do you have any numbness or focal weakness? No    Do you have diabetes? No    Do you have problems with bleeding or clotting? No    Do you have an rashes or other skin lesions? No               ESTABLISHED PATIENT FOLLOW-UP:  RECHECK (Left knee)       HISTORY OF PRESENT ILLNESS  Mr. Brown is a pleasant 57 year old year old male who presents to clinic today for follow-up of left knee effusion and pain.  Patient continues to experience pain and effusion of left knee.  Has tried voltaren, tubigrip compression and gentle ROM exercises.      Date of injury: 2.5 weeks ago  Date last seen: 12/5/17  Following Therapeutic Plan: Yes  Pain: Unchanged  Function: Unchanged  Interval History: As above     MEDICAL HISTORY  There is no problem list on file for this patient.      Current Outpatient Prescriptions   Medication Sig Dispense Refill     diclofenac (VOLTAREN) 75 MG EC tablet Take 1 tablet (75 mg) by mouth 2 times daily 14 tablet 0     diclofenac (VOLTAREN) 75 MG EC  tablet Take 1 tablet (75 mg) by mouth 2 times daily as needed for moderate pain (Patient not taking: Reported on 12/5/2017) 30 tablet 1       No Known Allergies    No family history on file.    Additional medical/Social/Surgical histories reviewed in [facility] and updated as appropriate.     REVIEW OF SYSTEMS (12/15/2017)  CONSTITUTIONAL: Denies fever and weight loss  EYES: Denies acute vision changes  ENT: Denies hearing changes or difficulty swallowing  CARDIAC: Denies chest pain or edema  RESPIRATORY: Denies dyspnea, cough or wheeze  GASTROINTESTINAL: Denies abdominal pain, nausea, vomiting  MUSCULOSKELETAL: See HPI  SKIN: Denies any recent rash or lesion  NEUROLOGICAL: Denies numbness or focal weakness  PSYCHIATRIC: No history of psychiatric symptoms or problems  ENDOCRINE: Denies current diagnosis of diabetes  HEMATOLOGY: Denies episodes of easy bleeding     PHYSICAL EXAM  Ht 1.829 m (6')  Wt 94.8 kg (209 lb)  BMI 28.35 kg/m2    General Appearance: Well appearing, alert, in no acute distress, well-hydrated, and well nourished  Skin: No rashes, lesions, or ecchymosis present  Cardiovascular: no signs of upper or lower extremity edema  Respiratory: no respiratory distress, no audible wheezing, no labored breathing, symmetric thoracic excursion  Psychiatric: mood and affect are appropriate, patient is oriented to time, place and person  Musculoskeletal - Left knee  - stance: normal gait without limp, no obvious leg length discrepancy  - inspection: moderate effusion, no ecchymosis, normal muscle tone, normal bone and joint alignment, no obvious deformity  - palpation: No medial or lateral joint line tenderness, patella and patellar tendon non-tender, normal popliteal pulse  - ROM: 110 degrees flexion, -5 degrees extension, pain at terminal extension passively, tightness with flexion.  - strength: 5/5 in flexion, 5/5 in extension  - neuro: no sensory or motor deficit  - special tests:  (-) Lachman  (-) anterior  drawer  (-) posterior drawer  (-) Mariah  (-) varus at 0 and 30 degrees flexion  (-) valgus at 0 and 30 degrees flexion  (-) Mirza s compression test  Neuro  - no sensory or motor deficit, grossly normal coordination, normal muscle tone  Skin  - no ecchymosis, no erythema, warmth, or induration, no obvious rash  Psych  - interactive, appropriate, normal mood and affect    IMAGING : XR Left Knee 3V    FINDINGS: Frontal views of both knees, as well as a lateral and axial  view of the left knee were obtained. Mild joint space narrowing in the  medial femorotibial joint compartment of both knee joints.  Moderate-sized left knee joint effusion. Left knee patella is well  aligned.         IMPRESSION: Moderate-sized left knee joint effusion, with mild joint  space narrowing in the medial femorotibial joint compartment of both  knee joints.     WILFRED BUSTAMANTE MD     ASSESSMENT & PLAN  Mr. Brown is a 57 year old year old male who presents to clinic today for follow-up of his left knee pain and effusion.  Examination essentially unchanged from previous.  I suspect exacerbation secondary to acute meniscal tear of left knee.  We discussed available treatment options and agreed to proceed with PT and corticosteroid injection of left knee.    -Aspiration of left knee / corticosteroid injection  -Physical therapy; [facility] program suggested  -Tubigrip compression  -HEP  -Follow up 1 month; consider MRI if no improvement    It was a pleasure seeing Jose Antonio.    PROCEDURE    Knee Injection - Intraarticular Aspiration/Injection  The patient was informed of the risks and the benefits of the procedure and a written consent was signed.  The patient s left knee was prepped with chlorhexidine in sterile fashion.   40 mg of triamcinolone suspension was drawn up into a 5 mL syringe with 4mL of 1% lidocaine. Additional syringe with 3cc of lidocaine.   Injection was performed using substerile technique.  A 1.5-inch 25-gauge needle was used  to enter the lateral aspect of the left knee and injection of 3cc lidocaine used for anesthesia.  After anesthesia achieved,  20ga 1.5 inch needle was used to aspirate 60cc of straw colored fluid.  Corticosteroid was injected after exchanging syringes. Injection performed successfully without difficulty.  There were no complications. The patient tolerated the procedure well. There was negligible bleeding.   The patient was instructed to ice the knee upon leaving clinic and refrain from overuse over the next 3 days.   The patient was instructed to call or go to the emergency room with any unusual pain, swelling, redness, or if otherwise concerned.  A follow up appointment will be scheduled to evaluate response to the injection, and to assess range of motion and pain.    Again, thank you for allowing me to participate in the care of your patient.      Sincerely,    Natalio Hall, DO

## 2017-12-16 NOTE — PROGRESS NOTES
ESTABLISHED PATIENT FOLLOW-UP:  RECHECK (Left knee)       HISTORY OF PRESENT ILLNESS  Mr. Brown is a pleasant 57 year old year old male who presents to clinic today for follow-up of left knee effusion and pain.  Patient continues to experience pain and effusion of left knee.  Has tried voltaren, tubigrip compression and gentle ROM exercises.      Date of injury: 2.5 weeks ago  Date last seen: 12/5/17  Following Therapeutic Plan: Yes  Pain: Unchanged  Function: Unchanged  Interval History: As above     MEDICAL HISTORY  There is no problem list on file for this patient.      Current Outpatient Prescriptions   Medication Sig Dispense Refill     diclofenac (VOLTAREN) 75 MG EC tablet Take 1 tablet (75 mg) by mouth 2 times daily 14 tablet 0     diclofenac (VOLTAREN) 75 MG EC tablet Take 1 tablet (75 mg) by mouth 2 times daily as needed for moderate pain (Patient not taking: Reported on 12/5/2017) 30 tablet 1       No Known Allergies    No family history on file.    Additional medical/Social/Surgical histories reviewed in [facility] and updated as appropriate.     REVIEW OF SYSTEMS (12/15/2017)  CONSTITUTIONAL: Denies fever and weight loss  EYES: Denies acute vision changes  ENT: Denies hearing changes or difficulty swallowing  CARDIAC: Denies chest pain or edema  RESPIRATORY: Denies dyspnea, cough or wheeze  GASTROINTESTINAL: Denies abdominal pain, nausea, vomiting  MUSCULOSKELETAL: See HPI  SKIN: Denies any recent rash or lesion  NEUROLOGICAL: Denies numbness or focal weakness  PSYCHIATRIC: No history of psychiatric symptoms or problems  ENDOCRINE: Denies current diagnosis of diabetes  HEMATOLOGY: Denies episodes of easy bleeding     PHYSICAL EXAM  Ht 1.829 m (6')  Wt 94.8 kg (209 lb)  BMI 28.35 kg/m2    General Appearance: Well appearing, alert, in no acute distress, well-hydrated, and well nourished  Skin: No rashes, lesions, or ecchymosis present  Cardiovascular: no signs of upper or lower extremity edema  Respiratory:  no respiratory distress, no audible wheezing, no labored breathing, symmetric thoracic excursion  Psychiatric: mood and affect are appropriate, patient is oriented to time, place and person  Musculoskeletal - Left knee  - stance: normal gait without limp, no obvious leg length discrepancy  - inspection: moderate effusion, no ecchymosis, normal muscle tone, normal bone and joint alignment, no obvious deformity  - palpation: No medial or lateral joint line tenderness, patella and patellar tendon non-tender, normal popliteal pulse  - ROM: 110 degrees flexion, -5 degrees extension, pain at terminal extension passively, tightness with flexion.  - strength: 5/5 in flexion, 5/5 in extension  - neuro: no sensory or motor deficit  - special tests:  (-) Lachman  (-) anterior drawer  (-) posterior drawer  (-) Mariah  (-) varus at 0 and 30 degrees flexion  (-) valgus at 0 and 30 degrees flexion  (-) Mirza s compression test  Neuro  - no sensory or motor deficit, grossly normal coordination, normal muscle tone  Skin  - no ecchymosis, no erythema, warmth, or induration, no obvious rash  Psych  - interactive, appropriate, normal mood and affect    IMAGING : XR Left Knee 3V    FINDINGS: Frontal views of both knees, as well as a lateral and axial  view of the left knee were obtained. Mild joint space narrowing in the  medial femorotibial joint compartment of both knee joints.  Moderate-sized left knee joint effusion. Left knee patella is well  aligned.         IMPRESSION: Moderate-sized left knee joint effusion, with mild joint  space narrowing in the medial femorotibial joint compartment of both  knee joints.     WILFRED BUSTAMANTE MD     ASSESSMENT & PLAN  Mr. Brown is a 57 year old year old male who presents to clinic today for follow-up of his left knee pain and effusion.  Examination essentially unchanged from previous.  I suspect exacerbation secondary to acute meniscal tear of left knee.  We discussed available treatment  options and agreed to proceed with PT and corticosteroid injection of left knee.    -Aspiration of left knee / corticosteroid injection  -Physical therapy; [facility] program suggested  -Tubigrip compression  -HEP  -Follow up 1 month; consider MRI if no improvement    It was a pleasure seeing Jose Antonio.    PROCEDURE    Knee Injection - Intraarticular Aspiration/Injection  The patient was informed of the risks and the benefits of the procedure and a written consent was signed.  The patient s left knee was prepped with chlorhexidine in sterile fashion.   40 mg of triamcinolone suspension was drawn up into a 5 mL syringe with 4mL of 1% lidocaine. Additional syringe with 3cc of lidocaine.   Injection was performed using substerile technique.  A 1.5-inch 25-gauge needle was used to enter the lateral aspect of the left knee and injection of 3cc lidocaine used for anesthesia.  After anesthesia achieved,  20ga 1.5 inch needle was used to aspirate 60cc of straw colored fluid.  Corticosteroid was injected after exchanging syringes. Injection performed successfully without difficulty.  There were no complications. The patient tolerated the procedure well. There was negligible bleeding.   The patient was instructed to ice the knee upon leaving clinic and refrain from overuse over the next 3 days.   The patient was instructed to call or go to the emergency room with any unusual pain, swelling, redness, or if otherwise concerned.  A follow up appointment will be scheduled to evaluate response to the injection, and to assess range of motion and pain.  Natalio Hall DO, CAM  Primary Care Sports Medicine

## 2017-12-18 ENCOUNTER — THERAPY VISIT (OUTPATIENT)
Dept: PHYSICAL THERAPY | Facility: CLINIC | Age: 57
End: 2017-12-18
Payer: COMMERCIAL

## 2017-12-18 DIAGNOSIS — M25.562 LEFT KNEE PAIN: Primary | ICD-10-CM

## 2017-12-18 PROCEDURE — 97161 PT EVAL LOW COMPLEX 20 MIN: CPT | Mod: GP | Performed by: PHYSICAL THERAPIST

## 2017-12-18 PROCEDURE — 97110 THERAPEUTIC EXERCISES: CPT | Mod: GP | Performed by: PHYSICAL THERAPIST

## 2017-12-18 PROCEDURE — G8978 MOBILITY CURRENT STATUS: HCPCS | Mod: GP | Performed by: PHYSICAL THERAPIST

## 2017-12-18 PROCEDURE — G8979 MOBILITY GOAL STATUS: HCPCS | Mod: GP | Performed by: PHYSICAL THERAPIST

## 2017-12-18 ASSESSMENT — ACTIVITIES OF DAILY LIVING (ADL)
STAND: ACTIVITY IS SOMEWHAT DIFFICULT
WEAKNESS: I HAVE THE SYMPTOM BUT IT DOES NOT AFFECT MY ACTIVITY
SQUAT: ACTIVITY IS FAIRLY DIFFICULT
RAW_SCORE: 39
WALK: ACTIVITY IS SOMEWHAT DIFFICULT
KNEE_ACTIVITY_OF_DAILY_LIVING_SUM: 39
GO UP STAIRS: ACTIVITY IS SOMEWHAT DIFFICULT
GIVING WAY, BUCKLING OR SHIFTING OF KNEE: I HAVE THE SYMPTOM BUT IT DOES NOT AFFECT MY ACTIVITY
LIMPING: I HAVE THE SYMPTOM BUT IT DOES NOT AFFECT MY ACTIVITY
AS_A_RESULT_OF_YOUR_KNEE_INJURY,_HOW_WOULD_YOU_RATE_YOUR_CURRENT_LEVEL_OF_DAILY_ACTIVITY?: NEARLY NORMAL
KNEE_ACTIVITY_OF_DAILY_LIVING_SCORE: 55.71
HOW_WOULD_YOU_RATE_THE_OVERALL_FUNCTION_OF_YOUR_KNEE_DURING_YOUR_USUAL_DAILY_ACTIVITIES?: NEARLY NORMAL
STIFFNESS: THE SYMPTOM PREVENTS ME FROM ALL DAILY ACTIVITIES
PAIN: THE SYMPTOM AFFECTS MY ACTIVITY MODERATELY
SIT WITH YOUR KNEE BENT: ACTIVITY IS MINIMALLY DIFFICULT
SWELLING: THE SYMPTOM PREVENTS ME FROM ALL DAILY ACTIVITIES
GO DOWN STAIRS: ACTIVITY IS SOMEWHAT DIFFICULT
KNEEL ON THE FRONT OF YOUR KNEE: ACTIVITY IS SOMEWHAT DIFFICULT
HOW_WOULD_YOU_RATE_THE_CURRENT_FUNCTION_OF_YOUR_KNEE_DURING_YOUR_USUAL_DAILY_ACTIVITIES_ON_A_SCALE_FROM_0_TO_100_WITH_100_BEING_YOUR_LEVEL_OF_KNEE_FUNCTION_PRIOR_TO_YOUR_INJURY_AND_0_BEING_THE_INABILITY_TO_PERFORM_ANY_OF_YOUR_USUAL_DAILY_ACTIVITIES?: 75
RISE FROM A CHAIR: ACTIVITY IS MINIMALLY DIFFICULT

## 2017-12-18 NOTE — MR AVS SNAPSHOT
"              After Visit Summary   12/18/2017    Jose Antonio Brown    MRN: [id number]           Patient Information     Date Of Birth          1960        Visit Information        Provider Department      12/18/2017 12:10 PM Ana Braswell, PT [facility]        Today's Diagnoses     Left knee pain    -  1       Follow-ups after your visit        Your next 10 appointments already scheduled     Jan 02, 2018 10:40 AM CST   [facility] Extremity with Ana Braswell PT   [facility] ([facility] [place]  )    [id number]  [facility] [id number] [phone]            Jan 12, 2018  1:30 PM CST   Return Walk In Ortho with Natalio Hall,    [facility]opa[facility] In [facility] ([facility])    [address]  4th Floor  [facility] [id number] [phone]              Who to contact     If you have questions or need follow up information about today's clinic visit or your schedule please contact [facility] PH[facility]ICAL THERAP[facility] directly at [phone].  Normal or non-critical lab and imaging results will be communicated to you by MyChart, letter or phone within 4 business days after the clinic has received the results. If you do not hear from us within 7 days, please contact the clinic through [facility] or phone. If you have a critical or abnormal lab result, we will notify you by phone as soon as possible.  Submit refill requests through [facility] or call your pharmacy and they will forward the refill request to us. Please allow 3 business days for your refill to be completed.          Additional Information About Your Visit        MyChart Information     [facility] lets you send messages to your doctor, view your test results, renew your prescriptions, schedule appointments and more. To sign up, go to [email or web address] . Click on \"Log in\" on the " "left side of the screen, which will take you to the Welcome page. Then click on \"Sign up Now\" on the right side of the page.     You will be asked to enter the access code listed below, as well as some personal information. Please follow the directions to create your username and password.     Your access code is: [email or web address]  Expires: 3/5/2018  2:04 PM     Your access code will  in 90 days. If you need help or a new code, please call your [facility] clinic or [phone].        Care EveryWhere ID     This is your Care EveryWhere ID. This could be used by other organizations to access your [facility] medical records  [id number]         Blood Pressure from Last 3 Encounters:   17 129/75    Weight from Last 3 Encounters:   12/15/17 94.8 kg (209 lb)   17 95 kg (209 lb 6.4 oz)   17 96.2 kg (212 lb)              We Performed the Following     HC PT EVAL, LOW COMPLEXITY     [facility] INITIAL EVAL REPORT     THERAPEUTIC EXERCISES        Primary Care Provider Office Phone # Fax #    CasimiroOSMANY Hall [phone]       [address] [facility] [address]        Equal Access to Services     BAKARI AVALOS : Hadii marry ku hadasho Soomaali, waaxda luqadaha, qaybta kaalmada adeegyada, marcel simonin haykenyon snyder . So [facility] [phone].    ATENCIÓN: Si habla español, tiene a mcintyre disposición servicios gratuitos de asistencia lingüística. Llame al [phone].    We comply with applicable federal civil rights laws and [place] laws. We do not discriminate on the basis of race, color, national origin, age, disability, sex, sexual orientation, or gender identity.            Thank you!     Thank you for choosing [facility]ICAL THER[facility]  for your care. Our goal is always to provide you with excellent care. Hearing back from our patients is one way we can continue to improve our services. Please take a few minutes to complete the written survey that you may " receive in the mail after your visit with us. Thank you!             Your Updated Medication List - Protect others around you: Learn how to safely use, store and throw away your medicines at www.disposemymeds.org.          This list is accurate as of: 12/18/17 12:52 PM.  Always use your most recent med list.                   Brand Name Dispense Instructions for use Diagnosis    * diclofenac 75 MG EC tablet    VOLTAREN    30 tablet    Take 1 tablet (75 mg) by mouth 2 times daily as needed for moderate pain    Arthralgia of right lower leg       * diclofenac 75 MG EC tablet    VOLTAREN    14 tablet    Take 1 tablet (75 mg) by mouth 2 times daily    Knee effusion, left       * Notice:  This list has 2 medication(s) that are the same as other medications prescribed for you. Read the directions carefully, and ask your doctor or other care provider to review them with you.

## 2017-12-18 NOTE — PROGRESS NOTES
[facility] Evaluation  Subjective:    Patient is a 57 year old male presenting with rehab left knee hpi.   Jose Antonio Brown is a 57 year old male with a left knee condition.  Condition occurred with:  Insidious onset.  Condition occurred: for unknown reasons.  This is a new condition  Pt reports he began to ha e pain and swelling in the left knee about 4 weeks ago MD visit 12-15-17 received PT referral. He reports he had a xray that was negative. He does not recall anything that he did to irritate the knee. He reports he was climbing more stairs at the time. He had his knee drained last week. Currently is having some stiffness and edema.and pain especially when his knee is bent up and with stairs.    Patient reports pain:  Anterior and posterior.    Pain is described as aching and is intermittent and reported as 2/10.  Associated symptoms:  Loss of motion/stiffness. Pain is worse during the day and worse in the P.M..  Symptoms are exacerbated by sitting, bending/squatting, ascending stairs, descending stairs and walking and relieved by rest.  Since onset symptoms are gradually improving.  Special tests:  X-ray.                                                    Objective:    System                                           Hip Evaluation    Hip Strength:        Abduction:  Left: 4+/5     Pain:    Internal Rotation:  Left: 4+/5    Pain:                       Knee Evaluation:  ROM:    AROM    Hyperextension:  Left:  0    Right: 0  Extension:  Left: 3    Right:  0  Flexion: Left: 124    Right: 130    Pain: pain with full knee flexion and over pressure into extension    Strength:     Extension:  Left: 5/5   Pain:        Flexion:  Right: 5/5   Pain:    Quad Set Left: Delayed    Pain:     Ligament Testing:  Normal                Special Tests:   Left knee positive for the following special tests:  Patellar Compression    Palpation:  Normal      Edema:  Edema of the knee: mild prepatella.    Mobility  Testing:  Not Assessed            Functional Testing:          Quad:    Single Leg Squat:  Left:     Mild loss of control  Right:        Bilateral Leg Squat:                General     ROS    Assessment/Plan:      Patient is a 57 year old male with left side knee complaints.    Patient has the following significant findings with corresponding treatment plan.                Diagnosis 1:  L knee pain  Pain -  hot/cold therapy, electric stimulation and manual therapy  Decreased ROM/flexibility - manual therapy and therapeutic exercise  Decreased strength - therapeutic exercise and therapeutic activities  Edema - cold therapy  Impaired muscle performance - neuro re-education  Decreased function - therapeutic activities    Therapy Evaluation Codes:   1) History comprised of:   Personal factors that impact the plan of care:      None.    Comorbidity factors that impact the plan of care are:      None.     Medications impacting care: None.  2) Examination of Body Systems comprised of:   Body structures and functions that impact the plan of care:      Knee.   Activity limitations that impact the plan of care are:      Walking.  3) Clinical presentation characteristics are:   Stable/Uncomplicated.  4) Decision-Making    Low complexity using standardized patient assessment instrument and/or measureable assessment of functional outcome.  Cumulative Therapy Evaluation is: Low complexity.    Previous and current functional limitations:  (See Goal Flow Sheet for this information)    Short term and Long term goals: (See Goal Flow Sheet for this information)     Communication ability:  Patient appears to be able to clearly communicate and understand verbal and written communication and follow directions correctly.  Treatment Explanation - The following has been discussed with the patient:   RX ordered/plan of care  Anticipated outcomes  Possible risks and side effects  This patient would benefit from PT intervention to resume  normal activities.   Rehab potential is good.    Frequency:  1 X week, once daily  Duration:  for 6 weeks  Discharge Plan:  Achieve all LTG.  Independent in home treatment program.  Reach maximal therapeutic benefit.    Please refer to the daily flowsheet for treatment today, total treatment time and time spent performing 1:1 timed codes.

## 2017-12-20 NOTE — PROGRESS NOTES
[facility] Evaluation  Subjective:    Patient is a 57 year old male presenting with rehab left ankle/foot hpi.                                              Current occupation is .                       Knee Activity of Daily Living Score: 55.71            Objective:    System    Physical Exam    General     ROS    Assessment/Plan:

## 2017-12-23 ENCOUNTER — OFFICE VISIT (OUTPATIENT)
Dept: ORTHOPEDICS | Facility: CLINIC | Age: 57
End: 2017-12-23
Payer: COMMERCIAL

## 2017-12-23 VITALS
BODY MASS INDEX: 28.31 KG/M2 | HEIGHT: 72 IN | DIASTOLIC BLOOD PRESSURE: 84 MMHG | WEIGHT: 209 LBS | SYSTOLIC BLOOD PRESSURE: 121 MMHG

## 2017-12-23 DIAGNOSIS — M25.562 ACUTE PAIN OF LEFT KNEE: ICD-10-CM

## 2017-12-23 DIAGNOSIS — M25.462 EFFUSION, LEFT KNEE: Primary | ICD-10-CM

## 2017-12-23 LAB
APPEARANCE FLD: NORMAL
COLOR FLD: YELLOW
CRYSTALS SNV MICRO: NORMAL
GRAM STN SPEC: NORMAL
GRAM STN SPEC: NORMAL
LYMPHOCYTES NFR FLD MANUAL: 3 %
Lab: NORMAL
MONOS+MACROS NFR FLD MANUAL: 94 %
NEUTS BAND NFR FLD MANUAL: 3 %
RBC # FLD: NORMAL /UL
SPECIMEN SOURCE FLD: NORMAL
SPECIMEN SOURCE: NORMAL
WBC # FLD AUTO: 293 /UL

## 2017-12-23 RX ORDER — MELOXICAM 15 MG/1
15 TABLET ORAL DAILY
Qty: 30 TABLET | Refills: 0 | Status: SHIPPED | OUTPATIENT
Start: 2017-12-23 | End: 2018-01-23

## 2017-12-23 NOTE — LETTER
12/23/2017       RE: Jose Antonio Brown  [address] [place] [id number]     Dear Colleague,    Thank you for referring your patient, Jose Antonio Brown, to the [facility] SPORTS AND ORTHOPAEDIC WALK IN CLINIC at [facility]. Please see a copy of my visit note below.          SPORTS & ORTHOPEDIC WALK-IN FOLLOW-UP VISIT 12/23/2017    Interval History:     Follow up reason: follow up left knee pain    Date of injury: Early December    Date last seen: 12/15/17    Following Therapeutic Plan: Yes     Pain: Worsening - effusion and pain     Function: Worsening    Interval History: Increased pain in posterior knee. Had severe adductor cramp.    Medical History:    Any recent changes to your medical history? No    Any new medication prescribed since last visit? No    Review of Systems:    Do you have fever, chills, weight loss? No    Do you have any vision problems? No    Do you have any chest pain or edema? No    Do you have any shortness of breath or wheezing?  No    Do you have stomach problems? No    Do you have any numbness or focal weakness? No    Do you have diabetes? No    Do you have problems with bleeding or clotting? No    Do you have an rashes or other skin lesions? No             ESTABLISHED PATIENT FOLLOW-UP:  RECHECK (Left knee pain)       HISTORY OF PRESENT ILLNESS  Mr. Brown is a pleasant 57 year old year old male who presents to clinic today for follow-up of left knee pain.      Date of injury: No injury; started around 12/1/17  Date last seen: 10/25/2017  Following Therapeutic Plan: Yes; ice tubigrip, PT  Pain: Unchanged  Function: Unchanged  Interval History: Patient has been to PT on 12/18 for first visit.  He has been using tubigrip and elevating knee.  He had marked improvement after last aspiration and injection lasting about 2 days.    After 2 days, he noted increasing pain and swelling.  No erythema of knee. No warmth.  No inciting event.   Requests repeat aspiration so that he may get through the holidays without limp.     MEDICAL HISTORY  Patient Active Problem List   Diagnosis     Left knee pain       Current Outpatient Prescriptions   Medication Sig Dispense Refill     meloxicam (MOBIC) 15 MG tablet Take 1 tablet (15 mg) by mouth daily 30 tablet 0     diclofenac (VOLTAREN) 75 MG EC tablet Take 1 tablet (75 mg) by mouth 2 times daily 14 tablet 0     diclofenac (VOLTAREN) 75 MG EC tablet Take 1 tablet (75 mg) by mouth 2 times daily as needed for moderate pain (Patient not taking: Reported on 12/5/2017) 30 tablet 1       No Known Allergies    No family history on file.    Additional medical/Social/Surgical histories reviewed in [facility] and updated as appropriate.     REVIEW OF SYSTEMS (12/23/2017)  CONSTITUTIONAL: Denies fever and weight loss  EYES: Denies acute vision changes  ENT: Denies hearing changes or difficulty swallowing  CARDIAC: Denies chest pain or edema  RESPIRATORY: Denies dyspnea, cough or wheeze  GASTROINTESTINAL: Denies abdominal pain, nausea, vomiting  MUSCULOSKELETAL: See HPI  SKIN: Denies any recent rash or lesion  NEUROLOGICAL: Denies numbness or focal weakness  PSYCHIATRIC: No history of psychiatric symptoms or problems  ENDOCRINE: Denies current diagnosis of diabetes  HEMATOLOGY: Denies episodes of easy bleeding     PHYSICAL EXAM  /84  Ht 1.829 m (6')  Wt 94.8 kg (209 lb)  BMI 28.35 kg/m2    General Appearance: Well appearing, alert, in no acute distress, well-hydrated, and well nourished  Skin: No rashes, lesions, or ecchymosis present  Cardiovascular: no signs of upper or lower extremity edema  Respiratory: no respiratory distress, no audible wheezing, no labored breathing, symmetric thoracic excursion  Psychiatric: mood and affect are appropriate, patient is oriented to time, place and person  Musculoskeletal:  Musculoskeletal - Left knee  - stance: normal gait without limp, no obvious leg length discrepancy  -  "inspection: Large effusion, no ecchymosis, normal muscle tone, normal bone and joint alignment, no obvious deformity  - palpation: No medial or lateral joint line tenderness, patella and patellar tendon non-tender, normal popliteal pulse.  Ballotable patella.   - ROM: 110 degrees flexion, -5 degrees extension, pain at terminal extension passively, tightness with flexion.  - strength: 5/5 in flexion, 5/5 in extension  - neuro: no sensory or motor deficit  - special tests:  (-) Lachman  (-) anterior drawer  (-) posterior drawer  (-) Mariah  (-) varus at 0 and 30 degrees flexion  (-) valgus at 0 and 30 degrees flexion  (-) Mirza s compression test  Neuro  - no sensory or motor deficit, grossly normal coordination, normal muscle tone  Skin  - no ecchymosis, no erythema, warmth, or induration, no obvious rash  Psych  - interactive, appropriate, normal mood and affect     ASSESSMENT & PLAN  Mr. Brown is a 57 year old year old male who presents to clinic today for f/u of left knee pain.  Patient states that pain improved x 2 days after aspiration and corticosteroid injection but now large effusion has returned.  I still suspect this is secondary to osteoarthritis vs. Degenerative meniscal tear however will order MRI to further elucidate pathology.  Patient requests aspiration today, which I obliged given circumstances.  Will send to lab to r/o inflammatory process.    Dx:   Left knee effusion  Left knee pain    -MRI Left knee  -Meloxicam PRN daily  -Tubigrip compression  -Ice, elevation  -Follow up after MRI  -Synovial analysis    It was a pleasure seeing Jose Antonio.    PROCEDURE    LEFT Knee Aspiration - Intraarticular  The patient was informed of the risks and the benefits of the procedure and a written consent was signed.  The patient s left knee was prepped with chlorhexidine in sterile fashion.   3cc of 1% lidocaine was used with a 25 1.5\" needle to anesthetize the lateral portal of knee.  Aspiration was performed " using substerile technique.  A 1.5-inch 20-gauge needle was used to enter the lateral aspect of the left knee with 60cc syringe.  Aspiration of 75 cc of straw colored fluid was performed successfully without difficulty.  There were no complications. The patient tolerated the procedure well. There was negligible bleeding.   The patient was instructed to ice the knee upon leaving clinic and refrain from overuse over the next 3 days.   The patient was instructed to call or go to the emergency room with any unusual pain, swelling, redness, or if otherwise concerned.  A follow up appointment will be scheduled to evaluate response to the injection, and to assess range of motion and pain.    Natalio Hall DO, SHELLM  Primary Care Sports Medicine

## 2017-12-23 NOTE — PROGRESS NOTES
ESTABLISHED PATIENT FOLLOW-UP:  RECHECK (Left knee pain)       HISTORY OF PRESENT ILLNESS  Mr. Brown is a pleasant 57 year old year old male who presents to clinic today for follow-up of left knee pain.      Date of injury: No injury; started around 12/1/17  Date last seen: 10/25/2017  Following Therapeutic Plan: Yes; ice tubigrip, PT  Pain: Unchanged  Function: Unchanged  Interval History: Patient has been to PT on 12/18 for first visit.  He has been using tubigrip and elevating knee.  He had marked improvement after last aspiration and injection lasting about 2 days.    After 2 days, he noted increasing pain and swelling.  No erythema of knee. No warmth.  No inciting event.  Requests repeat aspiration so that he may get through the holidays without limp.     MEDICAL HISTORY  Patient Active Problem List   Diagnosis     Left knee pain       Current Outpatient Prescriptions   Medication Sig Dispense Refill     meloxicam (MOBIC) 15 MG tablet Take 1 tablet (15 mg) by mouth daily 30 tablet 0     diclofenac (VOLTAREN) 75 MG EC tablet Take 1 tablet (75 mg) by mouth 2 times daily 14 tablet 0     diclofenac (VOLTAREN) 75 MG EC tablet Take 1 tablet (75 mg) by mouth 2 times daily as needed for moderate pain (Patient not taking: Reported on 12/5/2017) 30 tablet 1       No Known Allergies    No family history on file.    Additional medical/Social/Surgical histories reviewed in [facility] and updated as appropriate.     REVIEW OF SYSTEMS (12/23/2017)  CONSTITUTIONAL: Denies fever and weight loss  EYES: Denies acute vision changes  ENT: Denies hearing changes or difficulty swallowing  CARDIAC: Denies chest pain or edema  RESPIRATORY: Denies dyspnea, cough or wheeze  GASTROINTESTINAL: Denies abdominal pain, nausea, vomiting  MUSCULOSKELETAL: See HPI  SKIN: Denies any recent rash or lesion  NEUROLOGICAL: Denies numbness or focal weakness  PSYCHIATRIC: No history of psychiatric symptoms or problems  ENDOCRINE: Denies current diagnosis  of diabetes  HEMATOLOGY: Denies episodes of easy bleeding     PHYSICAL EXAM  /84  Ht 1.829 m (6')  Wt 94.8 kg (209 lb)  BMI 28.35 kg/m2      General Appearance: Well appearing, alert, in no acute distress, well-hydrated, and well nourished  Skin: No rashes, lesions, or ecchymosis present  Cardiovascular: no signs of upper or lower extremity edema  Respiratory: no respiratory distress, no audible wheezing, no labored breathing, symmetric thoracic excursion  Psychiatric: mood and affect are appropriate, patient is oriented to time, place and person  Musculoskeletal:  Musculoskeletal - Left knee  - stance: normal gait without limp, no obvious leg length discrepancy  - inspection: Large effusion, no ecchymosis, normal muscle tone, normal bone and joint alignment, no obvious deformity  - palpation: No medial or lateral joint line tenderness, patella and patellar tendon non-tender, normal popliteal pulse.  Ballotable patella.   - ROM: 110 degrees flexion, -5 degrees extension, pain at terminal extension passively, tightness with flexion.  - strength: 5/5 in flexion, 5/5 in extension  - neuro: no sensory or motor deficit  - special tests:  (-) Lachman  (-) anterior drawer  (-) posterior drawer  (-) Mariah  (-) varus at 0 and 30 degrees flexion  (-) valgus at 0 and 30 degrees flexion  (-) Mirza s compression test  Neuro  - no sensory or motor deficit, grossly normal coordination, normal muscle tone  Skin  - no ecchymosis, no erythema, warmth, or induration, no obvious rash  Psych  - interactive, appropriate, normal mood and affect     ASSESSMENT & PLAN  Mr. Brown is a 57 year old year old male who presents to clinic today for f/u of left knee pain.  Patient states that pain improved x 2 days after aspiration and corticosteroid injection but now large effusion has returned.  I still suspect this is secondary to osteoarthritis vs. Degenerative meniscal tear however will order MRI to further elucidate pathology.   "Patient requests aspiration today, which I obliged given circumstances.  Will send to lab to r/o inflammatory process.    Dx:   Left knee effusion  Left knee pain    -MRI Left knee  -Meloxicam PRN daily  -Tubigrip compression  -Ice, elevation  -Follow up after MRI  -Synovial analysis    It was a pleasure seeing Jose Antonio.    PROCEDURE    LEFT Knee Aspiration - Intraarticular  The patient was informed of the risks and the benefits of the procedure and a written consent was signed.  The patient s left knee was prepped with chlorhexidine in sterile fashion.   3cc of 1% lidocaine was used with a 25 1.5\" needle to anesthetize the lateral portal of knee.  Aspiration was performed using substerile technique.  A 1.5-inch 20-gauge needle was used to enter the lateral aspect of the left knee with 60cc syringe.  Aspiration of 75 cc of straw colored fluid was performed successfully without difficulty.  There were no complications. The patient tolerated the procedure well. There was negligible bleeding.   The patient was instructed to ice the knee upon leaving clinic and refrain from overuse over the next 3 days.   The patient was instructed to call or go to the emergency room with any unusual pain, swelling, redness, or if otherwise concerned.  A follow up appointment will be scheduled to evaluate response to the injection, and to assess range of motion and pain.    Natalio Hall DO, CAQSM  Primary Care Sports Medicine    "

## 2017-12-23 NOTE — NURSING NOTE
[address] [address]  Dept: [phone]  ______________________________________________________________________________    Patient: Jose Antonio Brown   : 1960   MRN: [id number]   2017    INVASIVE PROCEDURE SAFETY CHECKLIST    Date: 2017   Procedure: Left knee aspiration with lidocaine  Patient Name: Jose Antonio Brown  MRN: [id number]  YOB: 1960    Action: Complete sections as appropriate. Any discrepancy results in a HARD COPY until resolved.     PRE PROCEDURE:  Patient ID verified with 2 identifiers (name and  or MRN): Yes  Procedure and site verified with patient/designee (when able): Yes  Accurate consent documentation in medical record: Yes  H&P (or appropriate assessment) documented in medical record: Yes  H&P must be up to 20 days prior to procedure and updates within 24 hours of procedure as applicable: NA  Relevant diagnostic and radiology test results appropriately labeled and displayed as applicable: Yes  Procedure site(s) marked with provider initials: NA    TIMEOUT:  Time-Out performed immediately prior to starting procedure, including verbal and active participation of all team members addressing the following:Yes  * Correct patient identify  * Confirmed that the correct side and site are marked  * An accurate procedure consent form  * Agreement on the procedure to be done  * Correct patient position  * Relevant images and results are properly labeled and appropriately displayed  * The need to administer antibiotics or fluids for irrigation purposes during the procedure as applicable   * Safety precautions based on patient history or medication use    DURING PROCEDURE: Verification of correct person, site, and procedures any time the responsibility for care of the patient is transferred to another member of the care team.     The following medication was given:     MEDICATION:  Lidocaine without  epinephrine  ROUTE: IA  SITE: Left knee  DOSE: 3mL  LOT #: [id number]  : [employer]  EXPIRATION DATE: 09/21  NDC#: 97051-273-78   Was there drug waste? Yes  Amount of drug waste (mL): 17.  Reason for waste:  Single use vial      Kori Gonzalez ATC  December 23, 2017

## 2017-12-23 NOTE — PROGRESS NOTES
SPORTS & ORTHOPEDIC WALK-IN FOLLOW-UP VISIT 12/23/2017    Interval History:     Follow up reason: follow up left knee pain    Date of injury: Early December    Date last seen: 12/15/17    Following Therapeutic Plan: Yes     Pain: Worsening - effusion and pain     Function: Worsening    Interval History: Increased pain in posterior knee. Had severe adductor cramp.    Medical History:    Any recent changes to your medical history? No    Any new medication prescribed since last visit? No    Review of Systems:    Do you have fever, chills, weight loss? No    Do you have any vision problems? No    Do you have any chest pain or edema? No    Do you have any shortness of breath or wheezing?  No    Do you have stomach problems? No    Do you have any numbness or focal weakness? No    Do you have diabetes? No    Do you have problems with bleeding or clotting? No    Do you have an rashes or other skin lesions? No

## 2017-12-23 NOTE — MR AVS SNAPSHOT
After Visit Summary   12/23/2017    Jose Antonio Brown    MRN: [id number]           Patient Information     Date Of Birth          1960        Visit Information        Provider Department      12/23/2017 11:00 AM Natalio Hall, DO [facility]opa[facility]        Today's Diagnoses     Effusion, left knee    -  1    Acute pain of left knee           Follow-ups after your visit        Your next 10 appointments already scheduled     Jan 02, 2018 10:40 AM CST   [facility] Extremity with Ana Braswell PT   [facility] ([place]  )    [address]  [facility] [id number] [phone]            Jan 06, 2018  7:00 AM CST   (Arrive by 6:45 AM)   MR KNEE LEFT W/O CONTRAST with [id number]   [facility] ([facility])    [address]  1st Floor  [facility] [address] [phone]           Take your medicines as usual, unless your doctor tells you not to. Bring a list of your current medicines to your exam (including vitamins, minerals and over-the-counter drugs). Also bring the results of similar scans you may have had.  Please remove any body piercings and hair extensions before you arrive.  Follow your doctor s orders. If you do not, we may have to postpone your exam.  You will not have contrast for this exam. You do not need to do anything special to prepare.  The MRI machine uses a strong magnet. Please wear clothes without metal (snaps, zippers). A sweatsuit works well, or we may give you a hospital gown.   **IMPORTANT** THE INSTRUCTIONS BELOW ARE ONLY FOR THOSE PATIENTS WHO HAVE BEEN TOLD THEY WILL RECEIVE SEDATION OR GENERAL ANESTHESIA DURING THEIR MRI PROCEDURE:  IF YOU WILL RECEIVE SEDATION (take medicine to help you relax during your exam):   You must get the medicine from your doctor before you arrive. Bring the medicine to the exam. Do not take it at home.   Arrive  one hour early. Bring someone who can take you home after the test. Your medicine will make you sleepy. After the exam, you may not drive, take a bus or take a taxi by yourself.   No eating 8 hours before your exam. You may have clear liquids up until 4 hours before your exam. (Clear liquids include water, clear tea, black coffee and fruit juice without pulp.)  IF YOU WILL RECEIVE ANESTHESIA (be asleep for your exam):   Arrive 1 1/2 hours early. Bring someone who can take you home after the test. You may not drive, take a bus or take a taxi by yourself.   No eating 8 hours before your exam. You may have clear liquids up until 4 hours before your exam. (Clear liquids include water, clear tea, black coffee and fruit juice without pulp.)   You will spend four to five hours in the recovery room.  Please call the Imaging Department at your exam site with any questions.              Future tests that were ordered for you today     Open Future Orders        Priority Expected Expires Ordered    MR Knee Left w/o Contrast Routine  12/23/2018 12/23/2017            Who to contact     Please call your clinic at [phone] to:    Ask questions about your health    Make or cancel appointments    Discuss your medicines    Learn about your test results    Speak to your doctor   If you have compliments or concerns about an experience at your clinic, or if you wish to file a complaint, please contact [facility] Patient Relations at [phone] or email us at [email or web address]         Additional Information About Your Visit        Fast Orientationhart Information     [employer] is an electronic gateway that provides easy, online access to your medical records. With [employer], you can request a clinic appointment, read your test results, renew a prescription or communicate with your care team.     To sign up for [place] visit the website at [email or web address]   You will be asked to enter the access  code listed below, as well as some personal information. Please follow the directions to create your username and password.     Your access code is: [email or web address]  Expires: 3/5/2018  2:04 PM     Your access code will  in 90 days. If you need help or a new code, please contact your [facility] or call [phone] for assistance.        Care EveryWhere ID     This is your Care EveryWhere ID. This could be used by other organizations to access your [place] medical records  [id number]        Your Vitals Were     Height BMI (Body Mass Index)                1.829 m (6') 28.35 kg/m2           Blood Pressure from Last 3 Encounters:   17 121/84   17 129/75    Weight from Last 3 Encounters:   17 94.8 kg (209 lb)   12/15/17 94.8 kg (209 lb)   17 95 kg (209 lb 6.4 oz)              We Performed the Following     Cell count with differential fluid     Crystal ID synovial fluid     Fluid Culture Aerobic Bacterial     Gram stain     Large Joint/Bursa injection and/or drainage - Unilateral (Shoulder, Knee) [[id number]]          Today's Medication Changes          These changes are accurate as of: 17 12:01 PM.  If you have any questions, ask your nurse or doctor.               Start taking these medicines.        Dose/Directions    meloxicam 15 MG tablet   Commonly known as:  MOBIC   Used for:  Effusion, left knee, Acute pain of left knee   Started by:  Natalio Hall DO        Dose:  15 mg   Take 1 tablet (15 mg) by mouth daily   Quantity:  30 tablet   Refills:  0            Where to get your medicines      These medications were sent to [facility]/pharmacy #[id number] - [place], [place]  [address] [address]     Phone:  [phone]     meloxicam 15 MG tablet                Primary Care Provider Office Phone # Fax #    Natalio Hall [phone]       [address] [facility] [address]        Equal Access to Services      BAKARI AVALOS : Hadii aad ku rocco Harper, waaxda luqadaha, qaybta kaalmada rosajeraldbarbara, marcel kim haykenyon salinasshyamcarlie snyder . So [facility] [phone].    ATENCIÓN: Si habla niko, tiene a mcintyre disposición servicios gratuitos de asistencia lingüística. Llame al [phone].    We comply with applicable federal civil rights laws and [place] laws. We do not discriminate on the basis of race, color, national origin, age, disability, sex, sexual orientation, or gender identity.            Thank you!     Thank you for choosing [facility] SPORTS AND ORTHOPAEDIC WALK IN CLINIC  for your care. Our goal is always to provide you with excellent care. Hearing back from our patients is one way we can continue to improve our services. Please take a few minutes to complete the written survey that you may receive in the mail after your visit with us. Thank you!             Your Updated Medication List - Protect others around you: Learn how to safely use, store and throw away your medicines at www.disposemymeds.org.          This list is accurate as of: 12/23/17 12:01 PM.  Always use your most recent med list.                   Brand Name Dispense Instructions for use Diagnosis    * diclofenac 75 MG EC tablet    VOLTAREN    30 tablet    Take 1 tablet (75 mg) by mouth 2 times daily as needed for moderate pain    Arthralgia of right lower leg       * diclofenac 75 MG EC tablet    VOLTAREN    14 tablet    Take 1 tablet (75 mg) by mouth 2 times daily    Knee effusion, left       meloxicam 15 MG tablet    MOBIC    30 tablet    Take 1 tablet (15 mg) by mouth daily    Effusion, left knee, Acute pain of left knee       * Notice:  This list has 2 medication(s) that are the same as other medications prescribed for you. Read the directions carefully, and ask your doctor or other care provider to review them with you.

## 2017-12-28 LAB
BACTERIA SPEC CULT: NO GROWTH
Lab: NORMAL
SPECIMEN SOURCE: NORMAL

## 2018-01-02 ENCOUNTER — THERAPY VISIT (OUTPATIENT)
Dept: PHYSICAL THERAPY | Facility: CLINIC | Age: 58
End: 2018-01-02
Payer: COMMERCIAL

## 2018-01-02 DIAGNOSIS — M25.562 ACUTE PAIN OF LEFT KNEE: ICD-10-CM

## 2018-01-02 PROCEDURE — 97110 THERAPEUTIC EXERCISES: CPT | Mod: GP | Performed by: PHYSICAL THERAPIST

## 2018-01-09 ENCOUNTER — HOSPITAL ENCOUNTER (OUTPATIENT)
Dept: MRI IMAGING | Facility: CLINIC | Age: 58
Discharge: HOME | End: 2018-01-09
Attending: FAMILY MEDICINE | Admitting: FAMILY MEDICINE
Payer: COMMERCIAL

## 2018-01-09 DIAGNOSIS — M25.462 EFFUSION, LEFT KNEE: ICD-10-CM

## 2018-01-09 DIAGNOSIS — M25.562 ACUTE PAIN OF LEFT KNEE: ICD-10-CM

## 2018-01-09 PROCEDURE — 73721 MRI JNT OF LWR EXTRE W/O DYE: CPT | Mod: LT

## 2018-01-10 ENCOUNTER — TELEPHONE (OUTPATIENT)
Dept: ORTHOPEDICS | Facility: CLINIC | Age: 58
End: 2018-01-10

## 2018-01-10 NOTE — TELEPHONE ENCOUNTER
Call to Mike to discuss results of MRI.  Patient voicemail reached.  Discussed MRI results and treatment options which primarily include  brace, physical therapy, and aspiration/injection at this point.  However given patient with recent surgery by Dr. Malhotra for meniscus on right, it may be worth allowing Dr. Malhotra to weigh in on management.     Natalio Hall, DO CAQSM  Primary Care Sports Medicine

## 2018-01-10 NOTE — TELEPHONE ENCOUNTER
Patient had called wondering about his MRI results and wanting a follow up appointment with Dr. Hall. [facility] returned the call and let him know that a message would be sent to Dr. Hall asking him to contact the patient. An appointment was also made for the patient on 1/16 because the patient believes he may need another aspiration done. He states a lot of the swelling has come back since his last aspiration. All patient questions were answered.

## 2018-01-16 ENCOUNTER — OFFICE VISIT (OUTPATIENT)
Dept: ORTHOPEDICS | Facility: CLINIC | Age: 58
End: 2018-01-16
Payer: COMMERCIAL

## 2018-01-16 ENCOUNTER — THERAPY VISIT (OUTPATIENT)
Dept: PHYSICAL THERAPY | Facility: CLINIC | Age: 58
End: 2018-01-16
Payer: COMMERCIAL

## 2018-01-16 VITALS — BODY MASS INDEX: 28.44 KG/M2 | HEIGHT: 72 IN | WEIGHT: 210 LBS

## 2018-01-16 DIAGNOSIS — G89.29 CHRONIC PAIN OF LEFT KNEE: ICD-10-CM

## 2018-01-16 DIAGNOSIS — M25.562 CHRONIC PAIN OF LEFT KNEE: ICD-10-CM

## 2018-01-16 DIAGNOSIS — M23.204 DEGENERATIVE TEAR OF MEDIAL MENISCUS OF LEFT KNEE: ICD-10-CM

## 2018-01-16 DIAGNOSIS — M17.12 PRIMARY OSTEOARTHRITIS OF LEFT KNEE: Primary | ICD-10-CM

## 2018-01-16 PROCEDURE — 97110 THERAPEUTIC EXERCISES: CPT | Mod: GP | Performed by: PHYSICAL THERAPIST

## 2018-01-16 NOTE — LETTER
1/16/2018       RE: Jose Antonio Brown  [address] [place] [id number]     Dear Colleague,    Thank you for referring your patient, Jose Antonio Brown, to the [facility] SPORTS AND ORTHOPAEDIC WALK IN CLINIC at [facility]. Please see a copy of my visit note below.          SPORTS & ORTHOPEDIC WALK-IN FOLLOW-UP VISIT 1/16/2018    Interval History:     Follow up reason: MRI results, injection/aspiration    Date of injury: About 6 weeks ago    Date last seen: 12/23/17    Following Therapeutic Plan: Yes     Pain: Fluctuating between better and worse     Function: Fluctuating between better and worse     Interval History:      Medical History:    Any recent changes to your medical history? No    Any new medication prescribed since last visit? No    Review of Systems:    Do you have fever, chills, weight loss? No    Do you have any vision problems? No    Do you have any chest pain or edema? No    Do you have any shortness of breath or wheezing?  No    Do you have stomach problems? No    Do you have any numbness or focal weakness? No    Do you have diabetes? No    Do you have problems with bleeding or clotting? No    Do you have an rashes or other skin lesions? No             ESTABLISHED PATIENT FOLLOW-UP:  RECHECK (Left knee)    HISTORY OF PRESENT ILLNESS  Mr. Brown is a pleasant 57 year old year old male who presents to clinic today for follow-up of left knee pain.      Date of injury: No injury; started around 12/1/17  Date last seen: 12/23/17  Following Therapeutic Plan: Yes; ice, tubigrip, PT  Pain: Improving  Function: Improving  Interval History: Patient has continued PT diligently.  He has been using tubigrip and elevating knee.  Notes swelling of left knee has improved greatly since last aspiration on 12/23.  He did note difficulty last weekend, 1 week ago where he had significant pain.  All of his pain to date is medially about the knee.  No locking,  catching, or giving way.    MEDICAL HISTORY  Patient Active Problem List   Diagnosis     Left knee pain       Current Outpatient Prescriptions   Medication Sig Dispense Refill     meloxicam (MOBIC) 15 MG tablet Take 1 tablet (15 mg) by mouth daily 30 tablet 0     diclofenac (VOLTAREN) 75 MG EC tablet Take 1 tablet (75 mg) by mouth 2 times daily 14 tablet 0     diclofenac (VOLTAREN) 75 MG EC tablet Take 1 tablet (75 mg) by mouth 2 times daily as needed for moderate pain (Patient not taking: Reported on 12/5/2017) 30 tablet 1       No Known Allergies    No family history on file.    Additional medical/Social/Surgical histories reviewed in [facility] and updated as appropriate.     REVIEW OF SYSTEMS (12/23/2017)  CONSTITUTIONAL: Denies fever and weight loss  EYES: Denies acute vision changes  ENT: Denies hearing changes or difficulty swallowing  CARDIAC: Denies chest pain or edema  RESPIRATORY: Denies dyspnea, cough or wheeze  GASTROINTESTINAL: Denies abdominal pain, nausea, vomiting  MUSCULOSKELETAL: See HPI  SKIN: Denies any recent rash or lesion  NEUROLOGICAL: Denies numbness or focal weakness  PSYCHIATRIC: No history of psychiatric symptoms or problems  ENDOCRINE: Denies current diagnosis of diabetes  HEMATOLOGY: Denies episodes of easy bleeding     PHYSICAL EXAM  Ht 1.829 m (6')  Wt 95.3 kg (210 lb)  BMI 28.48 kg/m2      General Appearance: Well appearing, alert, in no acute distress, well-hydrated, and well nourished  Skin: No rashes, lesions, or ecchymosis present  Cardiovascular: no signs of upper or lower extremity edema  Respiratory: no respiratory distress, no audible wheezing, no labored breathing, symmetric thoracic excursion  Psychiatric: mood and affect are appropriate, patient is oriented to time, place and person  Musculoskeletal:  Musculoskeletal - Left knee  - stance: normal gait without limp, no obvious leg length discrepancy  - inspection: +1 effusion improved, no ecchymosis, normal muscle tone, normal  bone and joint alignment, no obvious deformity  - palpation: No medial or lateral joint line tenderness, patella and patellar tendon non-tender, normal popliteal pulse.  Ballotable patella.   - ROM: 110 degrees flexion, -5 degrees extension, pain at terminal extension passively, tightness with flexion.  - strength: 5/5 in flexion, 5/5 in extension  - neuro: no sensory or motor deficit  - special tests:  (-) Lachman  (-) anterior drawer  (-) posterior drawer  (-) Mariah  (-) varus at 0 and 30 degrees flexion  (-) valgus at 0 and 30 degrees flexion  (-) Mirza s compression test  Neuro  - no sensory or motor deficit, grossly normal coordination, normal muscle tone  Skin  - no ecchymosis, no erythema, warmth, or induration, no obvious rash  Psych  - interactive, appropriate, normal mood and affect     ASSESSMENT & PLAN  Mr. Brown is a 57 year old year old male who presents to clinic today for f/u of left knee effusion and f/u MRI.  We reviewed his MRI together revealing degenerative meniscal tear, predominately posterior horn as well as near thickness cartilage fissuring of LFC and patellar joint.  We discussed treatment options for osteoarthritis at length including HA, PRP and bracing options.      Dx:   Osteoarthritis of left knee  Degenerative medial meniscus of left knee    -Medial  brace   -Meloxicam PRN daily  -Continue PT/HEP  -Tubigrip compression  -Low impact exercise  -Referral Dr. Mcdaniel discuss PRP vs. GOETZ    It was a pleasure seeing Hemal Hall, , [facility]  Primary Care Sports Medicine

## 2018-01-16 NOTE — PROGRESS NOTES
ESTABLISHED PATIENT FOLLOW-UP:  RECHECK (Left knee)    HISTORY OF PRESENT ILLNESS  Mr. Brown is a pleasant 57 year old year old male who presents to clinic today for follow-up of left knee pain.      Date of injury: No injury; started around 12/1/17  Date last seen: 12/23/17  Following Therapeutic Plan: Yes; ice, tubigrip, PT  Pain: Improving  Function: Improving  Interval History: Patient has continued PT diligently.  He has been using tubigrip and elevating knee.  Notes swelling of left knee has improved greatly since last aspiration on 12/23.  He did note difficulty last weekend, 1 week ago where he had significant pain.  All of his pain to date is medially about the knee.  No locking, catching, or giving way.    MEDICAL HISTORY  Patient Active Problem List   Diagnosis     Left knee pain       Current Outpatient Prescriptions   Medication Sig Dispense Refill     meloxicam (MOBIC) 15 MG tablet Take 1 tablet (15 mg) by mouth daily 30 tablet 0     diclofenac (VOLTAREN) 75 MG EC tablet Take 1 tablet (75 mg) by mouth 2 times daily 14 tablet 0     diclofenac (VOLTAREN) 75 MG EC tablet Take 1 tablet (75 mg) by mouth 2 times daily as needed for moderate pain (Patient not taking: Reported on 12/5/2017) 30 tablet 1       No Known Allergies    No family history on file.    Additional medical/Social/Surgical histories reviewed in [facility] and updated as appropriate.     REVIEW OF SYSTEMS (12/23/2017)  CONSTITUTIONAL: Denies fever and weight loss  EYES: Denies acute vision changes  ENT: Denies hearing changes or difficulty swallowing  CARDIAC: Denies chest pain or edema  RESPIRATORY: Denies dyspnea, cough or wheeze  GASTROINTESTINAL: Denies abdominal pain, nausea, vomiting  MUSCULOSKELETAL: See HPI  SKIN: Denies any recent rash or lesion  NEUROLOGICAL: Denies numbness or focal weakness  PSYCHIATRIC: No history of psychiatric symptoms or problems  ENDOCRINE: Denies current diagnosis of diabetes  HEMATOLOGY: Denies episodes of  easy bleeding     PHYSICAL EXAM  Ht 1.829 m (6')  Wt 95.3 kg (210 lb)  BMI 28.48 kg/m2      General Appearance: Well appearing, alert, in no acute distress, well-hydrated, and well nourished  Skin: No rashes, lesions, or ecchymosis present  Cardiovascular: no signs of upper or lower extremity edema  Respiratory: no respiratory distress, no audible wheezing, no labored breathing, symmetric thoracic excursion  Psychiatric: mood and affect are appropriate, patient is oriented to time, place and person  Musculoskeletal:  Musculoskeletal - Left knee  - stance: normal gait without limp, no obvious leg length discrepancy  - inspection: +1 effusion improved, no ecchymosis, normal muscle tone, normal bone and joint alignment, no obvious deformity  - palpation: No medial or lateral joint line tenderness, patella and patellar tendon non-tender, normal popliteal pulse.  Ballotable patella.   - ROM: 110 degrees flexion, -5 degrees extension, pain at terminal extension passively, tightness with flexion.  - strength: 5/5 in flexion, 5/5 in extension  - neuro: no sensory or motor deficit  - special tests:  (-) Lachman  (-) anterior drawer  (-) posterior drawer  (-) Mariah  (-) varus at 0 and 30 degrees flexion  (-) valgus at 0 and 30 degrees flexion  (-) Mirza s compression test  Neuro  - no sensory or motor deficit, grossly normal coordination, normal muscle tone  Skin  - no ecchymosis, no erythema, warmth, or induration, no obvious rash  Psych  - interactive, appropriate, normal mood and affect     ASSESSMENT & PLAN  Mr. Brown is a 57 year old year old male who presents to clinic today for f/u of left knee effusion and f/u MRI.  We reviewed his MRI together revealing degenerative meniscal tear, predominately posterior horn as well as near thickness cartilage fissuring of LFC and patellar joint.  We discussed treatment options for osteoarthritis at length including HA, PRP and bracing options.      Dx:   Osteoarthritis of  left knee  Degenerative medial meniscus of left knee    -Medial  brace   -Meloxicam PRN daily  -Continue PT/HEP  -Tubigrip compression  -Low impact exercise  -Referral Dr. Mcdaniel discuss PRP vs. GOETZ    It was a pleasure seeing Jose Antonio.    Natalio Hall DO, CAQSM  Primary Care Sports Medicine

## 2018-01-16 NOTE — MR AVS SNAPSHOT
"              After Visit Summary   1/16/2018    Jose Antonio Brown    MRN: [id number]           Patient Information     Date Of Birth          1960        Visit Information        Provider Department      1/16/2018 10:40 AM Ana Braswell, PT [facility]        Today's Diagnoses     Chronic pain of left knee           Follow-ups after your visit        Your next 10 appointments already scheduled     Jan 16, 2018  2:00 PM CST   Return Walk In Ortho with Natalio Hall,    [facility] Sports and Orthopaedic Walk In Clinic ([facility] [place])    [address]  4th [address]  [place] [id number] [phone]            Jan 30, 2018 11:20 AM CST   ASHLEY Extremity with Ana Braswell PT   [facility] ([place]  )    [address]  [place] [id number] [phone]              Who to contact     If you have questions or need follow up information about today's clinic visit or your schedule please contact [facility] PH[facility] THER[facility] directly at [phone].  Normal or non-critical lab and imaging results will be communicated to you by [employer], letter or phone within 4 business days after the clinic has received the results. If you do not hear from us within 7 days, please contact the clinic through [employer] or phone. If you have a critical or abnormal lab result, we will notify you by phone as soon as possible.  Submit refill requests through [email or web address] or call your pharmacy and they will forward the refill request to us. Please allow 3 business days for your refill to be completed.          Additional Information About Your Visit        MyChart Information     [email or web address] lets you send messages to your doctor, view your test results, renew your prescriptions, schedule appointments and more. To sign up, go to [email or web address] . Click on \"Log in\" on " "the left side of the screen, which will take you to the Welcome page. Then click on \"Sign up Now\" on the right side of the page.     You will be asked to enter the access code listed below, as well as some personal information. Please follow the directions to create your username and password.     Your access code is: [email or web address]  Expires: 3/5/2018  2:04 PM     Your access code will  in 90 days. If you need help or a new code, please call your [place] clinic or [phone].        Care EveryWhere ID     This is your Care EveryWhere ID. This could be used by other organizations to access your [place] medical records  [id number]         Blood Pressure from Last 3 Encounters:   17 121/84   17 129/75    Weight from Last 3 Encounters:   17 94.8 kg (209 lb)   12/15/17 94.8 kg (209 lb)   17 95 kg (209 lb 6.4 oz)              We Performed the Following     THERAPEUTIC EXERCISES        Primary Care Provider Office Phone # Fax #    Casimiro Supik,  [phone] [address] [address]        Equal Access to Services     BAKARI AVALOS : Hadii marry benavideso Sothierry, waaxda luqadaha, qaybta kaalmada adeegyada, marcel resendez. So [facility] [phone].    ATENCIÓN: Si habla español, tiene a mcintyre disposición servicios gratuitos de asistencia lingüística. Llame al [phone].    We comply with applicable federal civil rights laws and [place] laws. We do not discriminate on the basis of race, color, national origin, age, disability, sex, sexual orientation, or gender identity.            Thank you!     Thank you for choosing [facility] PHYSICAL THERAPY  for your care. Our goal is always to provide you with excellent care. Hearing back from our patients is one way we can continue to improve our services. Please take a few minutes to complete the written survey that you may receive in the mail after your visit " with us. Thank you!             Your Updated Medication List - Protect others around you: Learn how to safely use, store and throw away your medicines at www.disposemymeds.org.          This list is accurate as of: 1/16/18 11:09 AM.  Always use your most recent med list.                   Brand Name Dispense Instructions for use Diagnosis    * diclofenac 75 MG EC tablet    VOLTAREN    30 tablet    Take 1 tablet (75 mg) by mouth 2 times daily as needed for moderate pain    Arthralgia of right lower leg       * diclofenac 75 MG EC tablet    VOLTAREN    14 tablet    Take 1 tablet (75 mg) by mouth 2 times daily    Knee effusion, left       meloxicam 15 MG tablet    MOBIC    30 tablet    Take 1 tablet (15 mg) by mouth daily    Effusion, left knee, Acute pain of left knee       * Notice:  This list has 2 medication(s) that are the same as other medications prescribed for you. Read the directions carefully, and ask your doctor or other care provider to review them with you.

## 2018-01-16 NOTE — MR AVS SNAPSHOT
After Visit Summary   1/16/2018    Jose Antonio Brown    MRN: [id number]           Patient Information     Date Of Birth          1960        Visit Information        Provider Department      1/16/2018 2:00 PM Natalio Hall DO [facility] Sports and Orthopaedic Walk In Clinic        Today's Diagnoses     Primary osteoarthritis of left knee    -  1       Follow-ups after your visit        Additional Services     ORTHOTICS REFERRAL       This referral order prints off in the [facility]; Medial  brace; Dx Left knee OA; LEFT Medial- brace.   (Orthotics & Prosthetics) Central Scheduling Office            SPORTS MEDICINE REFERRAL       Your provider has referred you to: Dr. Natalio Mcdaniel; Discuss synvisc vs. PRP    Please be aware that coverage of these services is subject to the terms and limitations of your health insurance plan.  Call member services at your health plan with any benefit or coverage questions.      Please bring the following to your appointment:    >>   Any x-rays, CTs or MRIs which have been performed.  Contact the facility where they were done to arrange for  prior to your scheduled appointment.    >>   List of current medications   >>   This referral request   >>   Any documents/labs given to you for this referral                  Your next 10 appointments already scheduled     Jan 30, 2018 11:20 AM CST   ASHLEY Extremity with Ana Braswell, PT   [facility] [place] [facility] ([place]  )    [address]  [place] [id number] [phone]            Feb 02, 2018  2:20 PM CST   (Arrive by 2:05 PM)   New Patient Visit with Natalio Mcdaniel MD   [facility]opa[facility] Clinic ([facility])    [address]  [address]th [place] [address] [phone]              Who to contact     Please call your clinic at [phone] to:    Ask questions about your  health    Make or cancel appointments    Discuss your medicines    Learn about your test results    Speak to your doctor   If you have compliments or concerns about an experience at your clinic, or if you wish to file a complaint, please contact [facility] Patient Relations at [phone] or email us at [email or web address]         Additional Information About Your Visit        [employer] Information     [employer] is an electronic gateway that provides easy, online access to your medical records. With [employer], you can request a clinic appointment, read your test results, renew a prescription or communicate with your care team.     To sign up for [employer] visit the website at [email or web address]   You will be asked to enter the access code listed below, as well as some personal information. Please follow the directions to create your username and password.     Your access code is: [email or web address]  Expires: 3/5/2018  2:04 PM     Your access code will  in 90 days. If you need help or a new code, please contact your [facility] or call [phone] for assistance.        Care EveryWhere ID     This is your Care EveryWhere ID. This could be used by other organizations to access your [place] medical records  [id number]        Your Vitals Were     Height BMI (Body Mass Index)                1.829 m (6') 28.48 kg/m2           Blood Pressure from Last 3 Encounters:   17 121/84   17 129/75    Weight from Last 3 Encounters:   18 95.3 kg (210 lb)   17 94.8 kg (209 lb)   12/15/17 94.8 kg (209 lb)              We Performed the Following     ORTHOTICS REFERRAL     SPORTS MEDICINE REFERRAL        Primary Care Provider Office Phone # Fax #    Natalio Hall [phone]       [address] [address]        Equal Access to Services     BAKARI AVALOS : gwendolyn Medina qaybta  marcel barthlionel snyder ah. So [facility] [phone].    ATENCIÓN: Si alanna pope, tiene a mcintyre disposición servicios gratuitos de asistencia lingüística. Nicolas al [phone].    We comply with applicable federal civil rights laws and [place] laws. We do not discriminate on the basis of race, color, national origin, age, disability, sex, sexual orientation, or gender identity.            Thank you!     Thank you for choosing [facility] SPORTS AND ORTHOPAEDIC WALK IN CLINIC  for your care. Our goal is always to provide you with excellent care. Hearing back from our patients is one way we can continue to improve our services. Please take a few minutes to complete the written survey that you may receive in the mail after your visit with us. Thank you!             Your Updated Medication List - Protect others around you: Learn how to safely use, store and throw away your medicines at www.disposemymeds.org.          This list is accurate as of: 1/16/18  2:47 PM.  Always use your most recent med list.                   Brand Name Dispense Instructions for use Diagnosis    * diclofenac 75 MG EC tablet    VOLTAREN    30 tablet    Take 1 tablet (75 mg) by mouth 2 times daily as needed for moderate pain    Arthralgia of right lower leg       * diclofenac 75 MG EC tablet    VOLTAREN    14 tablet    Take 1 tablet (75 mg) by mouth 2 times daily    Knee effusion, left       meloxicam 15 MG tablet    MOBIC    30 tablet    Take 1 tablet (15 mg) by mouth daily    Effusion, left knee, Acute pain of left knee       * Notice:  This list has 2 medication(s) that are the same as other medications prescribed for you. Read the directions carefully, and ask your doctor or other care provider to review them with you.

## 2018-01-16 NOTE — PROGRESS NOTES
SPORTS & ORTHOPEDIC WALK-IN FOLLOW-UP VISIT 1/16/2018    Interval History:     Follow up reason: MRI results, injection/aspiration    Date of injury: About 6 weeks ago    Date last seen: 12/23/17    Following Therapeutic Plan: Yes     Pain: Fluctuating between better and worse     Function: Fluctuating between better and worse     Interval History:      Medical History:    Any recent changes to your medical history? No    Any new medication prescribed since last visit? No    Review of Systems:    Do you have fever, chills, weight loss? No    Do you have any vision problems? No    Do you have any chest pain or edema? No    Do you have any shortness of breath or wheezing?  No    Do you have stomach problems? No    Do you have any numbness or focal weakness? No    Do you have diabetes? No    Do you have problems with bleeding or clotting? No    Do you have an rashes or other skin lesions? No

## 2018-01-17 NOTE — TELEPHONE ENCOUNTER
APPT INFO    Date /Time: 2/2/18 2:20PM   Reason for Appt: Primary OA of L Knee   Ref Provider/Clinic: Dr. Hall   Are there internal records? Yes/No?  IF YES, list clinic names: Yes - [facility]    Imaging in pacs:  XR L Knee 12/5/17  MRI L Knee 1/9/18   Are there outside records? Yes/No? no   Patient Contact (Y/N) & Call Details: No - Patient is referred. All records are in Epic/PACS.      Action: Chart reviewed

## 2018-01-23 ENCOUNTER — OFFICE VISIT (OUTPATIENT)
Dept: ORTHOPEDICS | Facility: CLINIC | Age: 58
End: 2018-01-23
Payer: COMMERCIAL

## 2018-01-23 VITALS
HEART RATE: 73 BPM | DIASTOLIC BLOOD PRESSURE: 76 MMHG | BODY MASS INDEX: 28.58 KG/M2 | SYSTOLIC BLOOD PRESSURE: 115 MMHG | WEIGHT: 211 LBS | HEIGHT: 72 IN

## 2018-01-23 DIAGNOSIS — M25.462 EFFUSION, LEFT KNEE: Primary | ICD-10-CM

## 2018-01-23 DIAGNOSIS — M25.562 ACUTE PAIN OF LEFT KNEE: ICD-10-CM

## 2018-01-23 DIAGNOSIS — M17.12 PRIMARY OSTEOARTHRITIS OF LEFT KNEE: ICD-10-CM

## 2018-01-23 RX ORDER — MELOXICAM 15 MG/1
15 TABLET ORAL DAILY
Qty: 30 TABLET | Refills: 0 | Status: SHIPPED | OUTPATIENT
Start: 2018-01-23 | End: 2018-02-17

## 2018-01-23 RX ORDER — TRIAMCINOLONE ACETONIDE 40 MG/ML
40 INJECTION, SUSPENSION INTRA-ARTICULAR; INTRAMUSCULAR ONCE
Qty: 1 ML | Refills: 0 | OUTPATIENT
Start: 2018-01-23 | End: 2018-01-23

## 2018-01-23 NOTE — LETTER
1/23/2018       RE: Jose Antonio Brown  [address] [place] [address]     Dear Colleague,    Thank you for referring your patient, Jose Antonio Brown, to the [facility] SPORTS AND ORTHOPAEDIC WALK IN CLINIC at [facility]. Please see a copy of my visit note below.          SPORTS & ORTHOPEDIC WALK-IN FOLLOW-UP VISIT 1/23/2018    Interval History:     Follow up reason: Left knee effusion     Date of injury: 12/1/17 - no injury    Date last seen: 1/16/18    Following Therapeutic Plan: Yes, waiting to receive custom brace    Pain:Flucuates     Function: Unchanged    Interval History:      Medical History:    Any recent changes to your medical history? No    Any new medication prescribed since last visit? No    Review of Systems:    Do you have fever, chills, weight loss? No    Do you have any vision problems? No    Do you have any chest pain or edema? No    Do you have any shortness of breath or wheezing?  No    Do you have stomach problems? No    Do you have any numbness or focal weakness? No    Do you have diabetes? No    Do you have problems with bleeding or clotting? No    Do you have an rashes or other skin lesions? No             ESTABLISHED PATIENT FOLLOW-UP:  RECHECK (Left knee effusion and pain)       HISTORY OF PRESENT ILLNESS  Mr. Brown is a pleasant 57 year old year old male who presents to clinic today for follow-up of left knee pain.  He was seen last week but notes increased effusion and as we discussed he would like to proceed with aspiration and corticosteroid injection today.    Date of injury: No injury; started around 12/1/17  Date last seen: 1/16/18  Following Therapeutic Plan: Yes; ice tubigrip, PT, custom brace fitted but will not arrive for another few days  Pain: Unchanged  Function: Decreased  Interval History: Patient has seen orthotist late last week for brace fitting of medial  brace.  He is awaiting customizations and will have  it late this week.  Otherwise noted increased swelling over the weekend.  Not as swollen as previously, but feels it would benefit from corticosteroid now.  Pain mostly medial sided with ambulation.  Performing HEP, tubigrip, ice, mobic. No other changes to knee.      MEDICAL HISTORY  Patient Active Problem List   Diagnosis     Left knee pain       Current Outpatient Prescriptions   Medication Sig Dispense Refill     meloxicam (MOBIC) 15 MG tablet Take 1 tablet (15 mg) by mouth daily 30 tablet 0     diclofenac (VOLTAREN) 75 MG EC tablet Take 1 tablet (75 mg) by mouth 2 times daily 14 tablet 0     diclofenac (VOLTAREN) 75 MG EC tablet Take 1 tablet (75 mg) by mouth 2 times daily as needed for moderate pain (Patient not taking: Reported on 12/5/2017) 30 tablet 1       No Known Allergies    No family history on file.    Additional medical/Social/Surgical histories reviewed in [facility] and updated as appropriate.     REVIEW OF SYSTEMS (12/23/2017)  CONSTITUTIONAL: Denies fever and weight loss  EYES: Denies acute vision changes  ENT: Denies hearing changes or difficulty swallowing  CARDIAC: Denies chest pain or edema  RESPIRATORY: Denies dyspnea, cough or wheeze  GASTROINTESTINAL: Denies abdominal pain, nausea, vomiting  MUSCULOSKELETAL: See HPI  SKIN: Denies any recent rash or lesion  NEUROLOGICAL: Denies numbness or focal weakness  PSYCHIATRIC: No history of psychiatric symptoms or problems  ENDOCRINE: Denies current diagnosis of diabetes  HEMATOLOGY: Denies episodes of easy bleeding     PHYSICAL EXAM  /76  Pulse 73  Ht 1.829 m (6')  Wt 95.7 kg (211 lb)  BMI 28.62 kg/m2      General Appearance: Well appearing, alert, in no acute distress, well-hydrated, and well nourished  Skin: No rashes, lesions, or ecchymosis present  Cardiovascular: no signs of upper or lower extremity edema  Respiratory: no respiratory distress, no audible wheezing, no labored breathing, symmetric thoracic excursion  Psychiatric: mood and  affect are appropriate, patient is oriented to time, place and person  Musculoskeletal:  Musculoskeletal - Left knee  - stance: normal gait without limp, no obvious leg length discrepancy  - inspection: Moderate effusion, no ecchymosis, normal muscle tone, normal bone and joint alignment, no obvious deformity  - palpation: No medial or lateral joint line tenderness, patella and patellar tendon non-tender, normal popliteal pulse.  Ballotable patella.   - ROM: 110 degrees flexion, -5 degrees extension, pain at terminal extension passively, tightness with flexion.  - strength: 5/5 in flexion, 5/5 in extension  - neuro: no sensory or motor deficit  - special tests:  (-) Lachman  (-) anterior drawer  (-) posterior drawer  (-) Mariah  (-) varus at 0 and 30 degrees flexion  (-) valgus at 0 and 30 degrees flexion  (-) Mirza s compression test  Neuro  - no sensory or motor deficit, grossly normal coordination, normal muscle tone  Skin  - no ecchymosis, no erythema, warmth, or induration, no obvious rash  Psych  - interactive, appropriate, normal mood and affect     ASSESSMENT & PLAN  Mr. Brown is a 57 year old year old male who presents to clinic today for f/u of left knee swelling.  We discussed options at this time and will go forward with aspiration and corticosteroid injection to knee.  Last injection 40mg kenalog about 6 weeks ago.  We can proceed with additional 40mg kenalog injection at this time and he will proceed to wear  when it arrives.     Dx:   Left knee effusion  Left knee pain    -Aspiration 35cc fluid  -Kenalog injection  -Tubigrip compression  -Medial  this week  -HEP/PT  -Ice, elevation, mobic prn  -Follow up Dr. Mcdaniel; PRP discussion vs. HA in 4 weeks    It was a pleasure seeing Jose Antonio.    PROCEDURE    LEFT Knee Aspiration - Intraarticular  The patient was informed of the risks and the benefits of the procedure and a written consent was signed.  The patient s left knee was prepped  "with chlorhexidine in sterile fashion.   3cc of 1% lidocaine was used with a 27 1.5\" needle to anesthetize the lateral portal of knee.  Aspiration was performed using substerile technique.  A 1.5-inch 20-gauge needle was used to enter the lateral aspect of the left knee [id number] syringe.  Aspiration of 35 cc of straw colored fluid was performed successfully without difficulty. Using hemostat at needle hub aspirate syringe was removed and syringe containing corticosteroid mixture of 40cc kenalog and 4 cc 1% lidocaine was affixed and injected. There were no complications. The patient tolerated the procedure well. There was negligible bleeding.   The patient was instructed to ice the knee upon leaving clinic and refrain from overuse over the next 3 days.   The patient was instructed to call or go to the emergency room with any unusual pain, swelling, redness, or if otherwise concerned.  A follow up appointment will be scheduled to evaluate response to the injection, and to assess range of motion and pain.  Natalio Hall DO, CAQSM  Primary Care Sports Medicine      "

## 2018-01-23 NOTE — NURSING NOTE
[address] [address]  Dept: [phone]  ______________________________________________________________________________    Patient: Jose Antonio Brown   : 1960   MRN: [id number]   2018    INVASIVE PROCEDURE SAFETY CHECKLIST    Date: 2017   Procedure: Left knee aspiration and CSI with Kenalog   Patient Name: Jose Antonio Brown  MRN: [id number]  YOB: 1960    Action: Complete sections as appropriate. Any discrepancy results in a HARD COPY until resolved.     PRE PROCEDURE:  Patient ID verified with 2 identifiers (name and  or MRN): Yes  Procedure and site verified with patient/designee (when able): Yes  Accurate consent documentation in medical record: Yes  H&P (or appropriate assessment) documented in medical record: Yes  H&P must be up to 20 days prior to procedure and updates within 24 hours of procedure as applicable: NA  Relevant diagnostic and radiology test results appropriately labeled and displayed as applicable: Yes  Procedure site(s) marked with provider initials: NA    TIMEOUT:  Time-Out performed immediately prior to starting procedure, including verbal and active participation of all team members addressing the following:Yes  * Correct patient identify  * Confirmed that the correct side and site are marked  * An accurate procedure consent form  * Agreement on the procedure to be done  * Correct patient position  * Relevant images and results are properly labeled and appropriately displayed  * The need to administer antibiotics or fluids for irrigation purposes during the procedure as applicable   * Safety precautions based on patient history or medication use    DURING PROCEDURE: Verification of correct person, site, and procedures any time the responsibility for care of the patient is transferred to another member of the care team.     The following medication was given:     MEDICATION:  Kenalog 40  mg  ROUTE: Intraarticular  SITE: Left knee  DOSE: 1cc  LOT #: [id number]  : [employer]  EXPIRATION DATE: MAY2019  NDC#: 3560-6771-74   Was there drug waste? No     MEDICATION:  Lidocaine without epinephrine  ROUTE: Intraarticular  SITE: Left knee  DOSE: 4cc  LOT #: [id number]  : [employer]  EXPIRATION DATE: 09/21  NDC#: 89174-221-55   Was there drug waste? Yes  Amount of drug waste (mL): 16.  Reason for waste:  Single use vial      Argentina Gabriel, ATC  January 23, 2018

## 2018-01-23 NOTE — MR AVS SNAPSHOT
After Visit Summary   1/23/2018    Jose Antonio Brown    MRN: [id number]           Patient Information     Date Of Birth          1960        Visit Information        Provider Department      1/23/2018 12:00 PM Natalio Hall DO [facility]        Today's Diagnoses     Effusion, left knee    -  1    Primary osteoarthritis of left knee        Acute pain of left knee           Follow-ups after your visit        Your next 10 appointments already scheduled     Jan 30, 2018 11:20 AM CST   [facility] Extremity with Ana Braswell PT   [facility] ([place]  )    [address]  [place] [address]   [phone]            Feb 20, 2018  1:00 PM CST   (Arrive by 12:45 PM)   New Patient Visit with Natalio Mcdaniel MD   [facility] ([facility])    [address]  [address] [facility] [id number] [phone]              Who to contact     Please call your clinic at [phone] to:    Ask questions about your health    Make or cancel appointments    Discuss your medicines    Learn about your test results    Speak to your doctor   If you have compliments or concerns about an experience at your clinic, or if you wish to file a complaint, please contact [facility] Patient Relations at [phone] or email us at [email or web address]         Additional Information About Your Visit        MyChart Information     Offbeat Guidest is an electronic gateway that provides easy, online access to your medical records. With [email or web address], you can request a clinic appointment, read your test results, renew a prescription or communicate with your care team.     To sign up for Offbeat Guidest visit the website at [email or web address]   You will be asked to enter the access code listed below, as well as some personal information. Please follow the  directions to create your username and password.     Your access code is: [email or web address]  Expires: 3/5/2018  2:04 PM     Your access code will  in 90 days. If you need help or a new code, please contact your [facility] or call [phone] for assistance.        Care EveryWhere ID     This is your Care EveryWhere ID. This could be used by other organizations to access your [place] medical records  [id number]        Your Vitals Were     Pulse Height BMI (Body Mass Index)             73 1.829 m (6') 28.62 kg/m2          Blood Pressure from Last 3 Encounters:   18 115/76   17 121/84   17 129/75    Weight from Last 3 Encounters:   18 95.7 kg (211 lb)   18 95.3 kg (210 lb)   17 94.8 kg (209 lb)              We Performed the Following     Large Joint/Bursa injection and/or drainage - Unilateral (Shoulder, Knee) []     TRIAMCINOLONE ACET INJ NOS          Today's Medication Changes          These changes are accurate as of: 18  7:33 PM.  If you have any questions, ask your nurse or doctor.               Start taking these medicines.        Dose/Directions    triamcinolone acetonide 40 MG/ML injection   Commonly known as:  KENALOG   Used for:  Effusion, left knee, Primary osteoarthritis of left knee   Started by:  Natalio Hall DO        Dose:  40 mg   1 mL (40 mg) by INTRA-ARTICULAR route once for 1 dose   Quantity:  1 mL   Refills:  0            Where to get your medicines      These medications were sent to [facility]/pharmacy #[id number] [place] [id number] [address]     Phone:  [phone]     meloxicam 15 MG tablet         Some of these will need a paper prescription and others can be bought over the counter.  Ask your nurse if you have questions.     You don't need a prescription for these medications     triamcinolone acetonide 40 MG/ML injection                Primary Care Provider Office  Phone # Fax #    Natalio Hall [phone] [address] [facility] [address]        Equal Access to Services     BAKARI AVALOS : Hadii aad ku hadbrandonmeggan Sotarynali, wagastonda luqadaha, qaybta kaalmada richar, marcel rabago lillian resendez. So [facility] [phone].    ATENCIÓN: Si habla español, tiene a mcintyre disposición servicios gratuitos de asistencia lingüística. Llame al [phone].    We comply with applicable federal civil rights laws and [place] laws. We do not discriminate on the basis of race, color, national origin, age, disability, sex, sexual orientation, or gender identity.            Thank you!     Thank you for choosing [facility]  for your care. Our goal is always to provide you with excellent care. Hearing back from our patients is one way we can continue to improve our services. Please take a few minutes to complete the written survey that you may receive in the mail after your visit with us. Thank you!             Your Updated Medication List - Protect others around you: Learn how to safely use, store and throw away your medicines at www.disposemymeds.org.          This list is accurate as of: 1/23/18  7:33 PM.  Always use your most recent med list.                   Brand Name Dispense Instructions for use Diagnosis    * diclofenac 75 MG EC tablet    VOLTAREN    30 tablet    Take 1 tablet (75 mg) by mouth 2 times daily as needed for moderate pain    Arthralgia of right lower leg       * diclofenac 75 MG EC tablet    VOLTAREN    14 tablet    Take 1 tablet (75 mg) by mouth 2 times daily    Knee effusion, left       meloxicam 15 MG tablet    MOBIC    30 tablet    Take 1 tablet (15 mg) by mouth daily    Effusion, left knee, Acute pain of left knee       triamcinolone acetonide 40 MG/ML injection    KENALOG    1 mL    1 mL (40 mg) by INTRA-ARTICULAR route once for 1 dose    Effusion, left knee, Primary osteoarthritis of left knee        * Notice:  This list has 2 medication(s) that are the same as other medications prescribed for you. Read the directions carefully, and ask your doctor or other care provider to review them with you.

## 2018-01-23 NOTE — PROGRESS NOTES
SPORTS & ORTHOPEDIC WALK-IN FOLLOW-UP VISIT 1/23/2018    Interval History:     Follow up reason: Left knee effusion     Date of injury: 12/1/17 - no injury    Date last seen: 1/16/18    Following Therapeutic Plan: Yes, waiting to receive custom brace    Pain:Flucuates     Function: Unchanged    Interval History:      Medical History:    Any recent changes to your medical history? No    Any new medication prescribed since last visit? No    Review of Systems:    Do you have fever, chills, weight loss? No    Do you have any vision problems? No    Do you have any chest pain or edema? No    Do you have any shortness of breath or wheezing?  No    Do you have stomach problems? No    Do you have any numbness or focal weakness? No    Do you have diabetes? No    Do you have problems with bleeding or clotting? No    Do you have an rashes or other skin lesions? No

## 2018-01-24 NOTE — PROGRESS NOTES
ESTABLISHED PATIENT FOLLOW-UP:  RECHECK (Left knee effusion and pain)       HISTORY OF PRESENT ILLNESS  Mr. Brown is a pleasant 57 year old year old male who presents to clinic today for follow-up of left knee pain.  He was seen last week but notes increased effusion and as we discussed he would like to proceed with aspiration and corticosteroid injection today.    Date of injury: No injury; started around 12/1/17  Date last seen: 1/16/18  Following Therapeutic Plan: Yes; ice tubigrip, PT, custom brace fitted but will not arrive for another few days  Pain: Unchanged  Function: Decreased  Interval History: Patient has seen orthotist late last week for brace fitting of medial  brace.  He is awaiting customizations and will have it late this week.  Otherwise noted increased swelling over the weekend.  Not as swollen as previously, but feels it would benefit from corticosteroid now.  Pain mostly medial sided with ambulation.  Performing HEP, tubigrip, ice, mobic. No other changes to knee.      MEDICAL HISTORY  Patient Active Problem List   Diagnosis     Left knee pain       Current Outpatient Prescriptions   Medication Sig Dispense Refill     meloxicam (MOBIC) 15 MG tablet Take 1 tablet (15 mg) by mouth daily 30 tablet 0     diclofenac (VOLTAREN) 75 MG EC tablet Take 1 tablet (75 mg) by mouth 2 times daily 14 tablet 0     diclofenac (VOLTAREN) 75 MG EC tablet Take 1 tablet (75 mg) by mouth 2 times daily as needed for moderate pain (Patient not taking: Reported on 12/5/2017) 30 tablet 1       No Known Allergies    No family history on file.    Additional medical/Social/Surgical histories reviewed in [facility] and updated as appropriate.     REVIEW OF SYSTEMS (12/23/2017)  CONSTITUTIONAL: Denies fever and weight loss  EYES: Denies acute vision changes  ENT: Denies hearing changes or difficulty swallowing  CARDIAC: Denies chest pain or edema  RESPIRATORY: Denies dyspnea, cough or wheeze  GASTROINTESTINAL: Denies  abdominal pain, nausea, vomiting  MUSCULOSKELETAL: See HPI  SKIN: Denies any recent rash or lesion  NEUROLOGICAL: Denies numbness or focal weakness  PSYCHIATRIC: No history of psychiatric symptoms or problems  ENDOCRINE: Denies current diagnosis of diabetes  HEMATOLOGY: Denies episodes of easy bleeding     PHYSICAL EXAM  /76  Pulse 73  Ht 1.829 m (6')  Wt 95.7 kg (211 lb)  BMI 28.62 kg/m2      General Appearance: Well appearing, alert, in no acute distress, well-hydrated, and well nourished  Skin: No rashes, lesions, or ecchymosis present  Cardiovascular: no signs of upper or lower extremity edema  Respiratory: no respiratory distress, no audible wheezing, no labored breathing, symmetric thoracic excursion  Psychiatric: mood and affect are appropriate, patient is oriented to time, place and person  Musculoskeletal:  Musculoskeletal - Left knee  - stance: normal gait without limp, no obvious leg length discrepancy  - inspection: Moderate effusion, no ecchymosis, normal muscle tone, normal bone and joint alignment, no obvious deformity  - palpation: No medial or lateral joint line tenderness, patella and patellar tendon non-tender, normal popliteal pulse.  Ballotable patella.   - ROM: 110 degrees flexion, -5 degrees extension, pain at terminal extension passively, tightness with flexion.  - strength: 5/5 in flexion, 5/5 in extension  - neuro: no sensory or motor deficit  - special tests:  (-) Lachman  (-) anterior drawer  (-) posterior drawer  (-) Mariah  (-) varus at 0 and 30 degrees flexion  (-) valgus at 0 and 30 degrees flexion  (-) Mirza s compression test  Neuro  - no sensory or motor deficit, grossly normal coordination, normal muscle tone  Skin  - no ecchymosis, no erythema, warmth, or induration, no obvious rash  Psych  - interactive, appropriate, normal mood and affect     ASSESSMENT & PLAN  Mr. Brown is a 57 year old year old male who presents to clinic today for f/u of left knee swelling.  We  "discussed options at this time and will go forward with aspiration and corticosteroid injection to knee.  Last injection 40mg kenalog about 6 weeks ago.  We can proceed with additional 40mg kenalog injection at this time and he will proceed to wear  when it arrives.     Dx:   Left knee effusion  Left knee pain    -Aspiration 35cc fluid  -Kenalog injection  -Tubigrip compression  -Medial  this week  -HEP/PT  -Ice, elevation, mobic prn  -Follow up Dr. Mcdaniel; PRP discussion vs. HA in 4 weeks    It was a pleasure seeing Jose Antonio.    PROCEDURE    LEFT Knee Aspiration - Intraarticular  The patient was informed of the risks and the benefits of the procedure and a written consent was signed.  The patient s left knee was prepped with chlorhexidine in sterile fashion.   3cc of 1% lidocaine was used with a 27 1.5\" needle to anesthetize the lateral portal of knee.  Aspiration was performed using substerile technique.  A 1.5-inch 20-gauge needle was used to enter the lateral aspect of the left knee uhex20np syringe.  Aspiration of 35 cc of straw colored fluid was performed successfully without difficulty. Using hemostat at needle hub aspirate syringe was removed and syringe containing corticosteroid mixture of 40cc kenalog and 4 cc 1% lidocaine was affixed and injected. There were no complications. The patient tolerated the procedure well. There was negligible bleeding.   The patient was instructed to ice the knee upon leaving clinic and refrain from overuse over the next 3 days.   The patient was instructed to call or go to the emergency room with any unusual pain, swelling, redness, or if otherwise concerned.  A follow up appointment will be scheduled to evaluate response to the injection, and to assess range of motion and pain.  Natalio Hall DO, [facility]  Primary Care Sports Medicine    "

## 2018-02-02 ENCOUNTER — PRE VISIT (OUTPATIENT)
Dept: ORTHOPEDICS | Facility: CLINIC | Age: 58
End: 2018-02-02

## 2018-02-17 DIAGNOSIS — M25.462 EFFUSION, LEFT KNEE: ICD-10-CM

## 2018-02-17 DIAGNOSIS — M25.562 ACUTE PAIN OF LEFT KNEE: ICD-10-CM

## 2018-02-17 RX ORDER — MELOXICAM 15 MG/1
15 TABLET ORAL DAILY
Qty: 30 TABLET | Refills: 0 | Status: SHIPPED | OUTPATIENT
Start: 2018-02-17 | End: 2018-03-26

## 2018-02-20 ENCOUNTER — OFFICE VISIT (OUTPATIENT)
Dept: ORTHOPEDICS | Facility: CLINIC | Age: 58
End: 2018-02-20
Payer: COMMERCIAL

## 2018-02-20 VITALS — BODY MASS INDEX: 27.82 KG/M2 | WEIGHT: 205.4 LBS | HEIGHT: 72 IN

## 2018-02-20 DIAGNOSIS — M23.307 DEGENERATIVE TEAR OF MENISCUS, LEFT: ICD-10-CM

## 2018-02-20 DIAGNOSIS — M17.12 ARTHRITIS OF LEFT KNEE: Primary | ICD-10-CM

## 2018-02-20 ASSESSMENT — ENCOUNTER SYMPTOMS
STIFFNESS: 1
NECK PAIN: 0
BACK PAIN: 0
ARTHRALGIAS: 1
MUSCLE WEAKNESS: 1
MUSCLE CRAMPS: 1
MYALGIAS: 1
JOINT SWELLING: 1

## 2018-02-20 NOTE — LETTER
2/20/2018       RE: Jose Antonio Brown  [address] [place] [address]     Dear Colleague,    Thank you for referring your patient, Jose Antonio Brown, to the [facility] at [facility]. Please see a copy of my visit note below.    HISTORY OF PRESENT ILLNESS  Mr. Brown is a pleasant 57 year old year old male who presents to clinic today with left knee pain   Has had injections of cortisone in the past and he has a meniscus tear  He is interested in PRP or synvisc conversation     Location: left knee  Quality:  achy pain    Severity: 5/10 at worst    Duration: a few months  Timing: occurs intermittently  Modifying factors:  resting and non-use makes it better, movement and use makes it worse  Associated signs & symptoms: swelling    Exercise: lifting weights and cardiovascular on machine  Additional history: as documented    MEDICAL HISTORY  Patient Active Problem List   Diagnosis     Left knee pain       Current Outpatient Prescriptions   Medication Sig Dispense Refill     meloxicam (MOBIC) 15 MG tablet Take 1 tablet (15 mg) by mouth daily 30 tablet 0       No Known Allergies    No family history on file.    Additional medical/Social/Surgical histories reviewed in [facility] and updated as appropriate.     REVIEW OF SYSTEMS (2/20/2018)  10 point ROS of systems including Constitutional, Eyes, Respiratory, Cardiovascular, Gastroenterology, Genitourinary, Integumentary, Musculoskeletal, Psychiatric were all negative except for pertinent positives noted in my HPI.     PHYSICAL EXAM  Vitals:    02/20/18 1303   Weight: 93.2 kg (205 lb 6.4 oz)   Height: 1.829 m (6')     Vital Signs: Ht 1.829 m (6')  Wt 93.2 kg (205 lb 6.4 oz)  BMI 27.86 kg/m2 Patient declined being weighed. Body mass index is 27.86 kg/(m^2).    General  - normal appearance, in no obvious distress  CV  - normal popliteal pulse  Pulm  - normal respiratory pattern,  non-labored  Musculoskeletal -  Left knee  - stance: normal gait without limp, no obvious leg length discrepancy  - inspection: trace effusion, no ecchymosis, normal muscle tone, normal bone and joint alignment, no obvious deformity  - palpation: medial joint line tenderness, patella and patellar tendon non-tender, normal popliteal pulse  - ROM: 135 degrees flexion, -5 degrees extension, pain at terminal extension passively  - strength: 5/5 in flexion, 5/5 in extension  - neuro: no sensory or motor deficit  - special tests:  (-) Lachman  (-) anterior drawer  (-) posterior drawer  (-) pivot shift  (+) Mariah  (+) Thessaly  (-) varus at 0 and 30 degrees flexion  (-) valgus at 0 and 30 degrees flexion  (-) Mirza s compression test  (-) patellar apprehension  Neuro  - no sensory or motor deficit, grossly normal coordination, normal muscle tone  Skin  - no ecchymosis, erythema, warmth, or induration, no obvious rash  Psych  - interactive, appropriate, normal mood and affect    ASSESSMENT & PLAN  58 yo male with left knee pain due to arthritis and degenerative medial meniscus tear  Will try to obtain approval for hyaluric acid injection  Will consider PRP as well at some point  Activity as tolerated  Cont. PT and HEP  F/u for possible hyaluronic acid injections      Again, thank you for allowing me to participate in the care of your patient.      Sincerely,    Natalio Mcdaniel MD

## 2018-02-20 NOTE — MR AVS SNAPSHOT
After Visit Summary   2018    Jose Antonio Brown    MRN: [id number]           Patient Information     Date Of Birth          1960        Visit Information        Provider Department      2018 1:00 PM Natalio Mcdaniel MD [facility]        Today's Diagnoses     Arthritis of left knee    -  1    Degenerative tear of meniscus, left           Follow-ups after your visit        Who to contact     Please call your clinic at [phone] to:    Ask questions about your health    Make or cancel appointments    Discuss your medicines    Learn about your test results    Speak to your doctor            Additional Information About Your Visit        MyChart Information     Blink Booking is an electronic gateway that provides easy, online access to your medical records. With Blink Booking, you can request a clinic appointment, read your test results, renew a prescription or communicate with your care team.     To sign up for Contactuallyt visit the website at [email or web address]   You will be asked to enter the access code listed below, as well as some personal information. Please follow the directions to create your username and password.     Your access code is: [email or web address]  Expires: 3/5/2018  2:04 PM     Your access code will  in 90 days. If you need help or a new code, please contact your [facility] or call [phone] for assistance.        Care EveryWhere ID     This is your Care EveryWhere ID. This could be used by other organizations to access your [place] medical records  [id number]        Your Vitals Were     Height BMI (Body Mass Index)                1.829 m (6') 27.86 kg/m2           Blood Pressure from Last 3 Encounters:   18 115/76   17 121/84   17 129/75    Weight from Last 3 Encounters:   18 93.2 kg (205 lb 6.4 oz)   18 95.7 kg (211 lb)   18 95.3 kg (210 lb)              Today, you had the  following     No orders found for display       Primary Care Provider Office Phone # Fax #    Natalio Hall [phone] [address] [facility] [address]        Equal Access to Services     BAKARI AVALOS : Kira tuttle ku hadbrandono Soomaali, waaxda luqadaha, qaybta kaalmada adeegyada, marcel rabago lillian resendez. So [facility] [phone].    ATENCIÓN: Si habla español, tiene a mcintyre disposición servicios gratuitos de asistencia lingüística. Llame al [phone].    We comply with applicable federal civil rights laws and [place] laws. We do not discriminate on the basis of race, color, national origin, age, disability, sex, sexual orientation, or gender identity.            Thank you!     Thank you for choosing [facility]  for your care. Our goal is always to provide you with excellent care. Hearing back from our patients is one way we can continue to improve our services. Please take a few minutes to complete the written survey that you may receive in the mail after your visit with us. Thank you!             Your Updated Medication List - Protect others around you: Learn how to safely use, store and throw away your medicines at www.disposemymeds.org.          This list is accurate as of 2/20/18 11:59 PM.  Always use your most recent med list.                   Brand Name Dispense Instructions for use Diagnosis    meloxicam 15 MG tablet    MOBIC    30 tablet    Take 1 tablet (15 mg) by mouth daily    Effusion, left knee, Acute pain of left knee

## 2018-02-20 NOTE — PROGRESS NOTES
HISTORY OF PRESENT ILLNESS  Mr. Brown is a pleasant 57 year old year old male who presents to clinic today with left knee pain   Has had injections of cortisone in the past and he has a meniscus tear  He is interested in PRP or synvisc conversation     Location: left knee  Quality:  achy pain    Severity: 5/10 at worst    Duration: a few months  Timing: occurs intermittently  Modifying factors:  resting and non-use makes it better, movement and use makes it worse  Associated signs & symptoms: swelling    Exercise: lifting weights and cardiovascular on machine  Additional history: as documented    MEDICAL HISTORY  Patient Active Problem List   Diagnosis     Left knee pain       Current Outpatient Prescriptions   Medication Sig Dispense Refill     meloxicam (MOBIC) 15 MG tablet Take 1 tablet (15 mg) by mouth daily 30 tablet 0       No Known Allergies    No family history on file.    Additional medical/Social/Surgical histories reviewed in [facility] and updated as appropriate.     REVIEW OF SYSTEMS (2/20/2018)  10 point ROS of systems including Constitutional, Eyes, Respiratory, Cardiovascular, Gastroenterology, Genitourinary, Integumentary, Musculoskeletal, Psychiatric were all negative except for pertinent positives noted in my HPI.     PHYSICAL EXAM  Vitals:    02/20/18 1303   Weight: 93.2 kg (205 lb 6.4 oz)   Height: 1.829 m (6')     Vital Signs: Ht 1.829 m (6')  Wt 93.2 kg (205 lb 6.4 oz)  BMI 27.86 kg/m2 Patient declined being weighed. Body mass index is 27.86 kg/(m^2).    General  - normal appearance, in no obvious distress  CV  - normal popliteal pulse  Pulm  - normal respiratory pattern, non-labored  Musculoskeletal -  Left knee  - stance: normal gait without limp, no obvious leg length discrepancy  - inspection: trace effusion, no ecchymosis, normal muscle tone, normal bone and joint alignment, no obvious deformity  - palpation: medial joint line tenderness, patella and patellar tendon non-tender, normal  popliteal pulse  - ROM: 135 degrees flexion, -5 degrees extension, pain at terminal extension passively  - strength: 5/5 in flexion, 5/5 in extension  - neuro: no sensory or motor deficit  - special tests:  (-) Lachman  (-) anterior drawer  (-) posterior drawer  (-) pivot shift  (+) Mariah  (+) Thessaly  (-) varus at 0 and 30 degrees flexion  (-) valgus at 0 and 30 degrees flexion  (-) Mirza s compression test  (-) patellar apprehension  Neuro  - no sensory or motor deficit, grossly normal coordination, normal muscle tone  Skin  - no ecchymosis, erythema, warmth, or induration, no obvious rash  Psych  - interactive, appropriate, normal mood and affect    ASSESSMENT & PLAN  56 yo male with left knee pain due to arthritis and degenerative medial meniscus tear  Will try to obtain approval for hyaluric acid injection  Will consider PRP as well at some point  Activity as tolerated  Cont. PT and HEP  F/u for possible hyaluronic acid injections    Natalio Mcdaniel MD, CAQSM

## 2018-03-07 PROBLEM — M25.562 LEFT KNEE PAIN: Status: RESOLVED | Noted: 2017-12-18 | Resolved: 2018-03-07

## 2018-03-07 NOTE — PROGRESS NOTES
Subjective:  HPI                    Objective:  System    Physical Exam    General     ROS    Assessment/Plan:    DISCHARGE REPORT    Progress reporting period is from 12-18-17 to 1-16-18.       SUBJECTIVE   Subjective: Reports  he had the MRI will meet with MD today. May be getting a brace to wear    Current Pain level: 1/10.     Previous pain level was  2/10  .   Changes in function:  Yes (See Goal flowsheet attached for changes in current functional level)  Adverse reaction to treatment or activity: None    OBJECTIVE  Changes noted in objective findings:  Patient has failed to return to therapy so current objective findings are unknown. and The objective findings below are from DOS 1-16-18.  Objective: Feeling good today but last week he had a flare up and had to cx events. He had been climbing more stairs.He is careful with his work to not squat or kneel. AROM 0-0-126 slight edema noted. No TTP along medial jt line. Advanced the exs slightly. Mini squats hurt step down is ok but the knee travels too far forward. Trial of stationary bike he will try at the gym.    ASSESSMENT/PLAN  Updated problem list and treatment plan: Diagnosis 1:  L knee pain    STG/LTGs have been met or progress has been made towards goals:  Yes (See Goal flow sheet completed today.)  Assessment of Progress: The patient has not returned to therapy. Current status is unknown.  Self Management Plans:  Patient has been instructed in a home treatment program.    Jose Antonio continues to require the following intervention to meet STG and LTG's:  NA    Recommendations:  DC PT    Please refer to the daily flowsheet for treatment today, total treatment time and time spent performing 1:1 timed codes.

## 2018-03-26 DIAGNOSIS — M25.562 ACUTE PAIN OF LEFT KNEE: ICD-10-CM

## 2018-03-26 DIAGNOSIS — M25.462 EFFUSION, LEFT KNEE: ICD-10-CM

## 2018-03-26 RX ORDER — MELOXICAM 15 MG/1
15 TABLET ORAL DAILY
Qty: 30 TABLET | Refills: 0 | Status: SHIPPED | OUTPATIENT
Start: 2018-03-26

## 2019-03-02 ENCOUNTER — MEDICAL CORRESPONDENCE (OUTPATIENT)
Dept: HEALTH INFORMATION MANAGEMENT | Facility: CLINIC | Age: 59
End: 2019-03-02

## 2019-03-02 ENCOUNTER — TRANSFERRED RECORDS (OUTPATIENT)
Dept: HEALTH INFORMATION MANAGEMENT | Facility: CLINIC | Age: 59
End: 2019-03-02

## 2019-04-03 ENCOUNTER — TELEPHONE (OUTPATIENT)
Dept: SLEEP MEDICINE | Facility: CLINIC | Age: 59
End: 2019-04-03

## 2019-05-03 ENCOUNTER — PRE VISIT (OUTPATIENT)
Dept: SLEEP MEDICINE | Facility: CLINIC | Age: 59
End: 2019-05-03

## 2019-05-03 NOTE — TELEPHONE ENCOUNTER
"  1.  Reason for the visit:  Consult to discuss snoring and waking up not feeling rested  2.  Referring provider and clinic name:  [facility]  3.  Previous Sleep Doctor or Pulmonlogist (clinic name)?  None   4.  Records, Procedures, Imaging, and Labs (see below)  No records to obtain        All NOTES from previous office visits that pertain to why they are being seen in the Sleep Center    Previous Sleep Studies, Chest CT, Echos and reports that pertain to why they are seeing Sleep Center    All Sleep records that have been done in the last 2 years that pertain to why they are seeing Sleep Center            Are they being seen for continuation of care for Cpap/Bipap/Avap/Trilogy/Dental Device? none    If yes to above Who and Where was Device issued/currently getting supplies from? na    Are you currently on \"Supplemental Oxygen\" during the day or night?   na                                                                                                                                                      Please remind pt to bring Cpap machine and ask to arrive 15 minutes early to appointment due traffic and congestion                                                 5. Pt Sleep Center Packet received Message left asking pt to arrive 30 minutes early to appointment if no packet received.        Yes: \"please make sure that you bring this to your appointment completed, either the doctor will not see you until this completed or you may be asked to reschedule your appointment.\"     No: mail or email to the pt and explain, \"please make sure that you bring this to your appointment completed, either the doctor will not see you until this completed or you may be asked to reschedule your appointment.\"     ~If pt coming early to fill packet out, ask that they come 30 minutes prior to their appointment~     6. Has the pt's medication list been updated and preferred pharmacy added?     7. Has the allergy list been " "reviewed?    \"Thank you for choosing [facility] and we look forward to seeing you at your upcoming appointment\"     "

## 2019-05-07 ENCOUNTER — OFFICE VISIT (OUTPATIENT)
Dept: SLEEP MEDICINE | Facility: CLINIC | Age: 59
End: 2019-05-07
Payer: COMMERCIAL

## 2019-05-07 VITALS
SYSTOLIC BLOOD PRESSURE: 119 MMHG | DIASTOLIC BLOOD PRESSURE: 68 MMHG | RESPIRATION RATE: 16 BRPM | BODY MASS INDEX: 29.39 KG/M2 | WEIGHT: 217 LBS | HEART RATE: 86 BPM | HEIGHT: 72 IN | OXYGEN SATURATION: 94 %

## 2019-05-07 DIAGNOSIS — R06.83 SNORING: Primary | ICD-10-CM

## 2019-05-07 DIAGNOSIS — E66.3 OVERWEIGHT (BMI 25.0-29.9): ICD-10-CM

## 2019-05-07 DIAGNOSIS — F51.8 ENVIRONMENTAL SLEEP DISORDER: ICD-10-CM

## 2019-05-07 DIAGNOSIS — J31.0 RHINITIS, UNSPECIFIED TYPE: ICD-10-CM

## 2019-05-07 PROCEDURE — 99215 OFFICE O/P EST HI 40 MIN: CPT | Performed by: NURSE PRACTITIONER

## 2019-05-07 RX ORDER — CETIRIZINE HYDROCHLORIDE 10 MG/1
10 TABLET ORAL
COMMUNITY
Start: 2018-04-09

## 2019-05-07 RX ORDER — FLUTICASONE PROPIONATE 50 MCG
2 SPRAY, SUSPENSION (ML) NASAL
COMMUNITY
Start: 2018-04-09

## 2019-05-07 ASSESSMENT — MIFFLIN-ST. JEOR: SCORE: 1847.31

## 2019-05-08 NOTE — PROGRESS NOTES
Visit Date:   05/07/2019      CHIEF COMPLAINT:  I have been asked to see Mr. Brown, who prefers to be called, Jose Antonio by Ag Sin, the nurse practitioner from the [facility] for possible sleep apnea with decades of excessive snoring.      HISTORY OF PRESENT ILLNESS:  The patient's objectives for this visit are of course to end of snoring, wondering if the fact that he does snore, if it adds harm to or risk to his health, and also of concern, of course, with his bed partner's health as well.      Reports at least a 20-year history of loud snoring, reports being heard outside the bedroom, occasional sleep fragmentation from this, awakening with an infrequent dry throat.  All symptoms appear to be worse if on back.  There have not been any witnessed apneas.  He has had a rare snort arousal.  Bed partner often needs to adjust the pillow to get him to sleep off of his back.  No signs of orexin deficiency, RLS, or abnormal nighttime behaviors.      SLEEP SCHEDULE:  Enters bed somewhere between 9:00 and 10:00 at night on workdays, between 10:00 and 11:00 on weekends.  Lights out is usually 1-2 hours after patient admits that he is a night owl.  Falls asleep in less than 2 minutes.  While in bed does participate in reading, eating, watching TV from the laptop, often working from bed and using a cell phone.  Wakeups occur 1 or less and usually with nocturia requiring less than a minute to reinitiate sleep.  He does exit bed at 8:30 on most workdays, between 10:00 or 11:00 on weekends.  Of note, he did have an 8:00 appointment with me this day and admits to on occasion needing to give up his sleep time for appointments.  Total sleep time estimated approximately 8 hours on average, 10 hours on weekends.  Naps once a week for 5-30 minutes, but it is rare.      SOCIAL, PERSONAL, FAMILY AND SLEEP SCALES:  Is , lives with wife and stepson.  Currently working as a .  Sleep environment is  conducive to good sleep.      FAMILY HISTORY:  Unaware of sleep disorders within family.  Alcohol intake, maybe once a month, within 3-4 hours of bedtime might have a drink.  No use of other recreational drugs.  Caffeine history:  Drinks primarily tea.  May have a cup of tea within 6 hours of bedtime.      [place] Sleepiness Scale is 2/24.  Denies problems with sleepiness while driving, sleepiness affecting quality of work, maybe 2-3/7 days might have some tiredness and fatigue, 0 out of 30 days mental health is not good.      REVIEW OF SYSTEMS:  A 14-point comprehensive review of systems completed and negative with the exception of the following:  Rare but occasional sore throat.      SURGICAL HISTORY:  Drained fluid from left knee.      MEDICAL CONDITIONS:  No medical conditions.      MEDICINE:  Ibuprofen, terbinafine for onychomycosis and cetirizine and fluticasone for seasonal allergies.       Allergies:    No Known Allergies    Medications:    Current Outpatient Medications   Medication Sig Dispense Refill     cetirizine (ZYRTEC) 10 MG tablet Take 10 mg by mouth       fluticasone (FLONASE) 50 MCG/ACT nasal spray Spray 2 sprays in nostril       meloxicam (MOBIC) 15 MG tablet Take 1 tablet (15 mg) by mouth daily 30 tablet 0       Problem List:  There are no active problems to display for this patient.       Past Medical/Surgical History:  No past medical history on file.  No past surgical history on file.    No family history on file.  Social History     Socioeconomic History     Marital status: Single     Spouse name: Not on file     Number of children: Not on file     Years of education: Not on file     Highest education level: Not on file   Occupational History     Not on file   Social Needs     Financial resource strain: Not on file     Food insecurity:     Worry: Not on file     Inability: Not on file     Transportation needs:     Medical: Not on file     Non-medical: Not on file   Tobacco Use     Smoking  status: Never Smoker     Smokeless tobacco: Never Used   Substance and Sexual Activity     Alcohol use: Not on file     Drug use: Not on file     Sexual activity: Not on file   Lifestyle     Physical activity:     Days per week: Not on file     Minutes per session: Not on file     Stress: Not on file   Relationships     Social connections:     Talks on phone: Not on file     Gets together: Not on file     Attends [employer]ism service: Not on file     Active member of club or organization: Not on file     Attends meetings of clubs or organizations: Not on file     Relationship status: Not on file     Intimate partner violence:     Fear of current or ex partner: Not on file     Emotionally abused: Not on file     Physically abused: Not on file     Forced sexual activity: Not on file   Other Topics Concern     Not on file   Social History Narrative     Not on file       Physical Examination:  Vitals: /68   Pulse 86   Resp 16   Ht 1.829 m (6')   Wt 98.4 kg (217 lb)   SpO2 94%   BMI 29.43 kg/m    BMI= Body mass index is 29.43 kg/m .    Neck Cir (cm): 39 cm    [place] Total Score 5/7/2019   Total score - [place] 2     Mal iv, nl  GENERAL APPEARANCE: healthy, alert and no distress  EYES: Eyes grossly normal to inspection  HENT: soft palate dependent, tongue base enlarged and nares patent  NECK: thyroid normal to palpation  RESP: lungs clear to auscultation - no rales, rhonchi or wheezes  CV: regular rates and rhythm, normal S1 S2, no S3 or S4 and no murmur, click or rub  Extremities are without clubbing, edema  Musculoskeletal:Moves without difficulty  Mallampati Class: IV.  Tonsillar Stage: 1  hidden by pillars.  Dental: Dentition in good condition.  Good advancement of lower jaw without tmd  Skin: without facial rash     ASSESSMENT AND PLAN:  It is my impression that Jesus, a 58-year-old male, has a low but present pretest probability for possible obstructive sleep apnea. His stop bang score is 3, with  estimate of risk primarily from being over 50 and being male.  He does have loud snoring and on occasion feels tired and fatigued, but this is not a frequent problem for him, especially in the setting of delayed sleep phase disorder where there appears to be tiredness with regard to occasional nights of insufficient sleep.  The following plan of care has been developed.   1.  Snoring:  We discussed the pathophysiology of snoring and some of the treatments that may help this which includes myofunctional therapy, reducing rhinitis, getting an adequate amount of sleep, possible positional therapy and we also discussed the treatment options for obstructive sleep apnea.  Health risks of snoring are low, and in some cases are more prevalent in the bedpartner than snoring individual.  At this time, especially in the setting of delayed sleep phase disorder and no witnessed apneas, we all came to an agreement that he would try some measures to reduce his snoring.  He will contact me if these measures do not help and then would consider likely an HST.  Questions were answered regarding some of the details of mild functional therapy and options for exercising the back of the oral airway as well as attending to rhinitis, etcetera.   2.  Rhinitis:  I have encouraged him to keep up with his cetirizine and his Flonase, especially during seasonal times where allergies are likely playing a part.   3.  Environmental sleep disruption:  More for his partner than himself.  I recommended considering pick noise to reduce bed partner's discomfort with gasp face snoring and see if that might matter.   4. Overweight: may get some benefit from weightloss.     A handout was given to him regarding some of the therapies for reduction in snoring and he was also given a handout which includes positional therapy information.  He will let me know if he needs any scripts for his HST regarding this.      Thank you for allowing me to participate in  this kind man's care.  He certainly does not have much with regard to weight loss issues and whether that would reduce her snoring, that may be a viable target.  However, as he is probably about 7-8 pounds overweight, not entirely likely.      Time spent with patient 60 minutes, of which greater than 50% was spent in counseling, education and coordination of care.         ADENIKE WOODS, PATRICIA             D: 2019   T: 2019   MT: TIM      Name:     ROBIN CAVAZOS   MRN:      [id number]        Account:      [id number]   :      1960           Visit Date:   2019      Document: [id number]

## 2020-12-27 ENCOUNTER — HEALTH MAINTENANCE LETTER (OUTPATIENT)
Age: 60
End: 2020-12-27

## 2021-03-12 ENCOUNTER — MEDICAL CORRESPONDENCE (OUTPATIENT)
Dept: HEALTH INFORMATION MANAGEMENT | Facility: CLINIC | Age: 61
End: 2021-03-12

## 2021-03-15 ENCOUNTER — TRANSCRIBE ORDERS (OUTPATIENT)
Dept: OTHER | Age: 61
End: 2021-03-15

## 2021-03-15 DIAGNOSIS — H53.9 VISION ABNORMALITIES: Primary | ICD-10-CM

## 2021-03-16 ENCOUNTER — OFFICE VISIT (OUTPATIENT)
Dept: OPHTHALMOLOGY | Facility: CLINIC | Age: 61
End: 2021-03-16
Attending: FAMILY MEDICINE
Payer: COMMERCIAL

## 2021-03-16 DIAGNOSIS — H52.4 PRESBYOPIA: ICD-10-CM

## 2021-03-16 DIAGNOSIS — H35.363 DEGENERATIVE DRUSEN OF BOTH EYES: ICD-10-CM

## 2021-03-16 DIAGNOSIS — H20.9 TRAUMATIC IRITIS: Primary | ICD-10-CM

## 2021-03-16 DIAGNOSIS — H40.003 GLAUCOMA SUSPECT OF BOTH EYES: ICD-10-CM

## 2021-03-16 DIAGNOSIS — H35.371 EPIRETINAL MEMBRANE (ERM) OF RIGHT EYE: ICD-10-CM

## 2021-03-16 DIAGNOSIS — H53.9 VISION ABNORMALITIES: ICD-10-CM

## 2021-03-16 PROCEDURE — G0463 HOSPITAL OUTPT CLINIC VISIT: HCPCS

## 2021-03-16 PROCEDURE — 92134 CPTRZ OPH DX IMG PST SGM RTA: CPT | Performed by: OPHTHALMOLOGY

## 2021-03-16 PROCEDURE — 99207 OCT OPTIC NERVE RNFL SPECTRALIS OU (BOTH EYES): CPT | Performed by: OPHTHALMOLOGY

## 2021-03-16 PROCEDURE — 92133 CPTRZD OPH DX IMG PST SGM ON: CPT | Performed by: OPHTHALMOLOGY

## 2021-03-16 PROCEDURE — 99204 OFFICE O/P NEW MOD 45 MIN: CPT | Performed by: OPHTHALMOLOGY

## 2021-03-16 RX ORDER — ATROPINE SULFATE 10 MG/ML
1 SOLUTION/ DROPS OPHTHALMIC 2 TIMES DAILY
COMMUNITY

## 2021-03-16 RX ORDER — PREDNISOLONE ACETATE 10 MG/ML
1-2 SUSPENSION/ DROPS OPHTHALMIC 2 TIMES DAILY
Qty: 10 ML | Refills: 0 | Status: SHIPPED | OUTPATIENT
Start: 2021-03-16

## 2021-03-16 ASSESSMENT — GONIOSCOPY
OS_NASAL: OPEN TO CBB
OS_SUPERIOR: OPEN TO CBB
OS_TEMPORAL: OPEN TO CBB
OS_INFERIOR: OPEN TO CBB

## 2021-03-16 ASSESSMENT — CUP TO DISC RATIO
OS_RATIO: 0.65
OD_RATIO: 0.7

## 2021-03-16 ASSESSMENT — TONOMETRY
IOP_METHOD: APPLANATION
IOP_METHOD: TONOPEN
OS_IOP_MMHG: 12
OD_IOP_MMHG: 23
OD_IOP_MMHG: 10
OS_IOP_MMHG: 21

## 2021-03-16 ASSESSMENT — VISUAL ACUITY
METHOD: SNELLEN - LINEAR
OD_SC+: -2
OD_SC: 20/25
OS_SC: 20/20

## 2021-03-16 ASSESSMENT — SLIT LAMP EXAM - LIDS: COMMENTS: MILD PTOSIS

## 2021-03-16 ASSESSMENT — CONF VISUAL FIELD
OD_NORMAL: 1
METHOD: COUNTING FINGERS
OS_NORMAL: 1
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
## 2021-03-16 NOTE — PATIENT INSTRUCTIONS
Start prednisolone (white or pink top, SHAKE WELL) two times a day in the right eye    You can stop the atropine/dilating drops (red top)    Follow up in 2 weeks or sooner if you see new flashes, new floaters, worse vision, eye pain, etc
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
## 2021-03-30 ENCOUNTER — OFFICE VISIT (OUTPATIENT)
Dept: OPHTHALMOLOGY | Facility: CLINIC | Age: 61
End: 2021-03-30
Attending: OPHTHALMOLOGY
Payer: COMMERCIAL

## 2021-03-30 ENCOUNTER — ANCILLARY PROCEDURE (OUTPATIENT)
Dept: GENERAL RADIOLOGY | Facility: CLINIC | Age: 61
End: 2021-03-30
Attending: FAMILY MEDICINE
Payer: COMMERCIAL

## 2021-03-30 DIAGNOSIS — M25.542 JOINT PAIN IN BOTH HANDS: ICD-10-CM

## 2021-03-30 DIAGNOSIS — H20.9 TRAUMATIC IRITIS: Primary | ICD-10-CM

## 2021-03-30 DIAGNOSIS — H57.02 ANISOCORIA: ICD-10-CM

## 2021-03-30 DIAGNOSIS — H40.003 GLAUCOMA SUSPECT OF BOTH EYES: ICD-10-CM

## 2021-03-30 DIAGNOSIS — H02.402 PTOSIS OF LEFT EYELID: ICD-10-CM

## 2021-03-30 DIAGNOSIS — M25.541 JOINT PAIN IN BOTH HANDS: ICD-10-CM

## 2021-03-30 PROCEDURE — 73120 X-RAY EXAM OF HAND: CPT | Mod: LT | Performed by: RADIOLOGY

## 2021-03-30 PROCEDURE — G0463 HOSPITAL OUTPT CLINIC VISIT: HCPCS

## 2021-03-30 PROCEDURE — 99214 OFFICE O/P EST MOD 30 MIN: CPT | Performed by: OPHTHALMOLOGY

## 2021-03-30 ASSESSMENT — EXTERNAL EXAM - RIGHT EYE: OD_EXAM: NORMAL

## 2021-03-30 ASSESSMENT — EXTERNAL EXAM - LEFT EYE: OS_EXAM: NORMAL

## 2021-03-30 ASSESSMENT — TONOMETRY
OD_IOP_MMHG: 17
OS_IOP_MMHG: 17
IOP_METHOD: TONOPEN

## 2021-03-30 ASSESSMENT — VISUAL ACUITY
OS_SC+: +1
OD_SC: 20/25
OD_SC+: +3
METHOD: SNELLEN - LINEAR
OS_SC: 20/25

## 2021-03-30 ASSESSMENT — SLIT LAMP EXAM - LIDS
COMMENTS: MILD PTOSIS
COMMENTS: PTOSIS L>R

## 2021-03-30 ASSESSMENT — CONF VISUAL FIELD
OS_NORMAL: 1
METHOD: COUNTING FINGERS
OD_NORMAL: 1

## 2021-03-30 NOTE — PATIENT INSTRUCTIONS
Decrease prednisolone (white or pink top, SHAKE WELL) to 1 time a day in the right eye for 2 weeks, then stop    Follow up in 3 weeks. We will check on the inflammation and the pupils.  If the pupils are still uneven or any changes, we can have you see a pupil specialist as well.
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
## 2021-06-18 ENCOUNTER — MEDICAL CORRESPONDENCE (OUTPATIENT)
Dept: HEALTH INFORMATION MANAGEMENT | Facility: CLINIC | Age: 61
End: 2021-06-18

## 2021-06-21 ENCOUNTER — TRANSCRIBE ORDERS (OUTPATIENT)
Dept: OTHER | Age: 61
End: 2021-06-21

## 2021-06-21 DIAGNOSIS — R20.2 PARESTHESIA: Primary | ICD-10-CM

## 2021-07-08 ENCOUNTER — OFFICE VISIT (OUTPATIENT)
Dept: NEUROLOGY | Facility: CLINIC | Age: 61
End: 2021-07-08
Attending: FAMILY MEDICINE
Payer: COMMERCIAL

## 2021-07-08 DIAGNOSIS — M54.17 LUMBOSACRAL RADICULOPATHY AT L5: Primary | ICD-10-CM

## 2021-07-08 DIAGNOSIS — R94.131 MYOTONIC CHANGES PRESENT ON ELECTROMYOGRAPHY (EMG): ICD-10-CM

## 2021-07-08 PROCEDURE — 95909 NRV CNDJ TST 5-6 STUDIES: CPT | Performed by: PSYCHIATRY & NEUROLOGY

## 2021-07-08 PROCEDURE — 95885 MUSC TST DONE W/NERV TST LIM: CPT | Mod: 59 | Performed by: PSYCHIATRY & NEUROLOGY

## 2021-07-08 PROCEDURE — 95886 MUSC TEST DONE W/N TEST COMP: CPT | Performed by: PSYCHIATRY & NEUROLOGY

## 2021-07-08 NOTE — PROGRESS NOTES
[facility]  Electrodiagnostic Laboratory    Nerve Conduction & EMG Report          Patient:       Jose Antonio Snowden  Patient ID:    [id number]  Gender:        male  YOB: 1960  Age:           61 Years 0 Months      Referring Physician: Trena Helm MD    History & Examination:    61 year old man with chief complaint of intermittent numbness at the right lateral calf from the knee to the ankle. He denies numbness at the right foot or back pain. Query right lumbosacral radiculopathy, peroneal mononeuropathy, or other neuromuscular explanation for this symptom.    Techniques: Motor and sensory conduction studies were done with surface recording electrodes. EMG was done with a concentric needle electrode.     Results:    Right sural, and bilateral superficial peroneal antidromic sensory NCSs were normal. Right deep peroneal motor NCS recorded from EDB, and common peroneal motor NCS recorded from TA, were normal. Right tibial motor NCS recorded from AH was normal. EMG showed increased insertional activity without sustained abnormal spontaneous activity, at the right TA, medial gastrocnemius, and tibialis posterior. There were brief myotonic discharges at the right gluteus medius and L5 paraspinals. All muscles sampled showed normal MUP morphology and recruitment except for the right gluteus medius where some large, long duration MUPs were identified, with normal recruitment. EMG of right vastus lateralis, peroneus longus, and left gluteus medius was normal.     Interpretation:    Abnormal study. The abnormal spontaneous activity noted at the right L5 paraspinal and gluteus medius muscles may be explained by a right L5 radiculopathy, although the lack of changes in distal L5-innervated muscles like the tibialis posterior and anterior is atypical. Alternatively, the findings may reflect a muscle channelopathy or generalized myotonic disorder. Clinical correlation is required.     EMG  Physician:    Jagdeep Mccabe MD       Sensory NCS      Nerve / Sites Rec. Site Onset Peak NP Amp Ref. PP Amp Dist Carmelo Ref. Temp     ms ms  V  V  V cm m/s m/s  C   R SURAL - Lat Mall 60      Calf Ankle 2.86 3.59 13.1 5.0 17.1 14 48.9 38.0 31.6   R SUP PERONEAL      Lat Leg [place] 2.66 3.39 17.9  19.2 12.5 47.1 38.0 31.5   L SUP PERONEAL      Lat Leg [place] 2.45 3.23 14.4  19.4 12.5 51.1 38.0 31.6       Motor NCS      Nerve / Sites Rec. Site Lat Ref. Amp Ref. Rel Amp Dist Carmelo Ref. Dur. Area Temp.     ms ms mV mV % cm m/s m/s ms %  C   R DEEP PERONEAL - EDB 60      Ankle EDB 3.96 6.00 9.3 2.0 100 8   5.05 100 31.5      FibHead EDB 10.31  7.3  78 33 51.9 38.0 6.30 94.9 31.5      Pop Fos EDB 12.60  7.1  75.8 9 39.3 38.0 6.20 92 31.5   R TIBIAL - AH      Ankle AH 4.06 6.00 11.9 4.0 100 8   5.47 100 31.5      Pop Fos AH 13.23  10.5  88 41 44.7 38.0 6.25 102 31.6   R PERONEAL - Tib Ant      Fib Head Tib Ant 2.45  7.0  100    12.71 100 31.5      Knee Tib Ant 4.48  6.9  98.2 9 44.3  11.51 88.8 31.5       EMG Summary Table     Spontaneous Recruitment MUAP    IA Fib PSW Fasc H.F. Pattern Amp Dur. PPP   R. TIB ANTERIOR 1+ None None None None N N N N   R. GASTROCN (MED) 1+ None None None None N N N N   R. VAST LATERALIS N None None None None N N N N   R. TIB POSTERIOR 1+ None None None None N N N N   R. L5 PSP N None None None Myotonia N N N N   R. GLUTEUS MED N None None None Myotonia N 2+ 1+ N   L. GLUTEUS MED N None None None None N N N N   R. PERON LONGUS N None None None None N N N N

## 2021-08-05 ENCOUNTER — TRANSFERRED RECORDS (OUTPATIENT)
Dept: HEALTH INFORMATION MANAGEMENT | Facility: CLINIC | Age: 61
End: 2021-08-05

## 2021-08-05 ENCOUNTER — MEDICAL CORRESPONDENCE (OUTPATIENT)
Dept: HEALTH INFORMATION MANAGEMENT | Facility: CLINIC | Age: 61
End: 2021-08-05

## 2021-08-10 ENCOUNTER — TRANSCRIBE ORDERS (OUTPATIENT)
Dept: OTHER | Age: 61
End: 2021-08-10

## 2021-08-10 DIAGNOSIS — M25.561 ACUTE PAIN OF RIGHT KNEE: Primary | ICD-10-CM

## 2021-08-13 NOTE — TELEPHONE ENCOUNTER
DIAGNOSIS: Acute pain of right knee/MRI/Trena Helm with [facility]   APPOINTMENT DATE: 8.16.21   NOTES STATUS DETAILS   OFFICE NOTE from referring provider Internal 8.5.21 Dr Trena Helm, [facility]   OFFICE NOTE from other specialist Internal L knee records internal    3.27.17 Rhonda Malhotra [facility]  3.13.17   DISCHARGE SUMMARY from hospital N/A    DISCHARGE REPORT from the ER N/A    OPERATIVE REPORT N/A    EMG report N/A    MEDICATION LIST Internal    MRI Internal 8.14.21 scheduled  3.9.17 R knee   DEXA (osteoporosis/bone health) N/A    CT SCAN N/A    XRAYS (IMAGES & REPORTS) N/A

## 2021-08-14 ENCOUNTER — ANCILLARY PROCEDURE (OUTPATIENT)
Dept: MRI IMAGING | Facility: CLINIC | Age: 61
End: 2021-08-14
Attending: FAMILY MEDICINE
Payer: COMMERCIAL

## 2021-08-14 DIAGNOSIS — M25.561 ACUTE PAIN OF RIGHT KNEE: ICD-10-CM

## 2021-08-14 PROCEDURE — 73721 MRI JNT OF LWR EXTRE W/O DYE: CPT | Mod: RT | Performed by: RADIOLOGY

## 2021-08-14 ASSESSMENT — ENCOUNTER SYMPTOMS
MUSCLE WEAKNESS: 1
NECK PAIN: 0
STIFFNESS: 1
LOSS OF CONSCIOUSNESS: 0
SEIZURES: 0
DISTURBANCES IN COORDINATION: 0
SPEECH CHANGE: 0
BACK PAIN: 1
MEMORY LOSS: 0
MYALGIAS: 1
HEADACHES: 0
PARALYSIS: 0
WEAKNESS: 1
ARTHRALGIAS: 1
JOINT SWELLING: 1
MUSCLE CRAMPS: 1
TINGLING: 1
NUMBNESS: 1
TREMORS: 0
DIZZINESS: 0

## 2021-08-16 ENCOUNTER — PRE VISIT (OUTPATIENT)
Dept: ORTHOPEDICS | Facility: CLINIC | Age: 61
End: 2021-08-16

## 2021-08-16 ENCOUNTER — OFFICE VISIT (OUTPATIENT)
Dept: ORTHOPEDICS | Facility: CLINIC | Age: 61
End: 2021-08-16
Payer: COMMERCIAL

## 2021-08-16 VITALS — BODY MASS INDEX: 29.12 KG/M2 | HEIGHT: 72 IN | WEIGHT: 215 LBS

## 2021-08-16 DIAGNOSIS — M25.561 ACUTE PAIN OF RIGHT KNEE: Primary | ICD-10-CM

## 2021-08-16 PROCEDURE — 99203 OFFICE O/P NEW LOW 30 MIN: CPT | Mod: 25 | Performed by: ORTHOPAEDIC SURGERY

## 2021-08-16 PROCEDURE — 20610 DRAIN/INJ JOINT/BURSA W/O US: CPT | Mod: RT | Performed by: ORTHOPAEDIC SURGERY

## 2021-08-16 RX ORDER — TRIAMCINOLONE ACETONIDE 40 MG/ML
40 INJECTION, SUSPENSION INTRA-ARTICULAR; INTRAMUSCULAR
Status: SHIPPED | OUTPATIENT
Start: 2021-08-16

## 2021-08-16 RX ORDER — LIDOCAINE HYDROCHLORIDE 5 MG/ML
8 INJECTION, SOLUTION INFILTRATION; INTRAVENOUS
Status: SHIPPED | OUTPATIENT
Start: 2021-08-16

## 2021-08-16 RX ADMIN — TRIAMCINOLONE ACETONIDE 40 MG: 40 INJECTION, SUSPENSION INTRA-ARTICULAR; INTRAMUSCULAR at 11:57

## 2021-08-16 RX ADMIN — LIDOCAINE HYDROCHLORIDE 8 ML: 5 INJECTION, SOLUTION INFILTRATION; INTRAVENOUS at 11:57

## 2021-08-16 ASSESSMENT — MIFFLIN-ST. JEOR: SCORE: 1823.23

## 2021-08-16 NOTE — PROGRESS NOTES
Patient seen and examined with the resident.     Assesment: medial compartment oa and medial meniscus tear    Plan: options reviewed including operative and nonop. Together we decided to proceed with nonop management including HEP and steroid injection. He will return to clinic if he wants to have a repeat injection.  He is allowed to have 2 to 3/year.  No lifetime maximum.  If would like to discuss surgery which would be an arthroscopic meniscectomy he can return to clinic to discuss this as well.  He understands that there will be no surgery within 6 weeks of an injection.    After written informed consent obtained and signed, after sufficient prepping and sterile technique, 40 mg of kenalog and , 8 cc of 1% lidocaine were injected without complication into the right knee. The patient tolerated the injection well and a sterile dressing was applied.    I did specifically discussed the patient the arthroscopic procedure will not help the portion of his symptoms coming from arthritis and that ultimately he may need to consider knee replacement surgery at some time the future if he fails to see lasting improvement from nonsurgical means.    I do think would be beneficial to get updated radiographs if and when he returns to see us.  This could be a regular knee series.    I agree with history, physical and imaging as well as the assessment and plan as detailed by Dr. Nguyen.

## 2021-08-16 NOTE — PROGRESS NOTES
CHIEF CONCERN: right knee pain     HISTORY:   61M here for evaluation of right knee. When sits for prolonged period, feels tightness in back of knee mainly on lateral > medial joint line and anterior knee below knee cap. Occasional swelling. No catching or locking. No christiane injuries. No numbness or tingling. No injections. No braces. Temporary relief with knee compression sleeve. No PT.     PAST MEDICAL HISTORY: (Reviewed with the patient and in the [facility] medical record)  1. No pertinent     PAST SURGICAL HISTORY: (Reviewed with the patient and in the [facility] medical record)  1. None     MEDICATIONS: (Reviewed with the patient and in the [facility] medical record)    Notable medications include: none     ALLERGIES: (Reviewed with the patient and in the [facility] medical record)  1. No drug allergies known       SOCIAL HISTORY: (Reviewed with the patient and in the medical record)  --Tobacco: never   --Occupation:  [employer]  --Avocation/Sport: tennis, softball, cycling     FAMILY HISTORY: (Reviewed with the patient and in the medical record)  -- No family history of bleeding, clotting, or difficulty with anesthesia    REVIEW OF SYSTEMS: (Reviewed with the patient and on the health intake form)  -- A comprehensive 10 point review of systems was conducted and is negative except as noted in the HPI    EXAM:     General: Awake, Alert and Oriented, No acute Distress. Articulate and Interactive    Body mass index is 29.16 kg/m .    Right Lower extremity :    Skin is Warm and Well perfused, no suggestion of infection    No scars      ROM 0-100 active and passive (0-125 on contralateral)    Stable to varus/valgus stress test at 0 and 30 knee flexion     No effusion     ttp lateral joint line, posteromedial joint line    +Mariah, tender with hyperflexion     +patellar grind     EHL/FHL/TA/GS 5/5    Sensation intact L3-S1    2+ Dorsalis Pedis Pulse    IMAGING:    MRI:   Right knee MRI w/o contrast 8/14/21  1.  Progression body posterior horn medial meniscal tear.  2. 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    a. Mixed interval change of grade IV chondromalacia in the  patellofemoral compartment with area of improvement and worsening.   b. No substantial change grade III chondromalacia of the lateral  compartment.  3. Likely chronic sprain fibular collateral ligament, posterolateral  capsule and medial supporting structures, similar to prior.  4. Free edge tear posterior horn lateral meniscus, similar to prior.    ASSESSMENT:  61M with right knee pathology as below   1. posterior horn medial meniscal tear  2. 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 Grade IV chondromalacia of patellofemoral compartmetn   4. Free edge posterior horn tear of lateral meniscus     Reviewed clinical findings and imaging with the patient. He has underlying arthritis in all compartments. His pain seems to be localized to medial > lateral joint line on exam. Reviewed management options including operative and nonoperative options. Operative option would include arthroscopy with partial medial and lateral meniscectomy. Risks and benefits discussed. nonoperative option includes corticosteroid injection, PT, brace, oral nsaids. He prefers to proceed with nonoperative route and steroid injection today.      PLAN:  1. Corticosteroid injection administered to right knee   2. Will follow up as needed for future injections or procedure as above     Procedure Note:    Consent was signed.  Site and side was verified with the patient.  Allergies were verified with patient.  Right knee was prepped and draped in a sterile fashion.  1 cc of 1% lidocaine and 40 mg of kenalog was injected into the right knee.  Pt tolerated the procedure well, noted immediate improvement in pain and ROM after injection.    Ariana Nguyen MD   Orthopedic Surgery, PGY5    Answers for HPI/ROS  submitted by the patient on 8/14/2021  General Symptoms: No  Skin Symptoms: No  HENT Symptoms: No  EYE SYMPTOMS: No  HEART SYMPTOMS: No  LUNG SYMPTOMS: No  INTESTINAL SYMPTOMS: No  URINARY SYMPTOMS: No  REPRODUCTIVE SYMPTOMS: No  SKELETAL SYMPTOMS: Yes  BLOOD SYMPTOMS: No  NERVOUS SYSTEM SYMPTOMS: Yes  MENTAL HEALTH SYMPTOMS: No  Back pain: Yes  Muscle aches: Yes  Neck pain: No  Swollen joints: Yes  Joint pain: Yes  Bone pain: No  Muscle cramps: Yes  Muscle weakness: Yes  Joint stiffness: Yes  Bone fracture: No  Trouble with coordination: No  Dizziness or trouble with balance: No  Fainting or black-out spells: No  Memory loss: No  Headache: No  Seizures: No  Speech problems: No  Tingling: Yes  Tremor: No  Weakness: Yes  Difficulty walking: Yes  Paralysis: No  Numbness: Yes

## 2021-08-16 NOTE — PROGRESS NOTES
Large Joint Injection: R knee joint    Date/Time: 8/16/2021 11:57 AM  Performed by: Jose Antonio Hermosillo MD  Authorized by: Jose Antonio Hermosillo MD     Indications:  Pain  Needle Size:  21 G  Guidance: landmark guided    Approach:  Anterolateral  Location:  Knee      Medications:  40 mg triamcinolone 40 MG/ML; 8 mL lidocaine (PF) 0.5 %  Outcome:  Tolerated well, no immediate complications  Procedure discussed: discussed risks, benefits, and alternatives    Consent Given by:  Patient  Timeout: timeout called immediately prior to procedure    Prep: patient was prepped and draped in usual sterile fashion

## 2021-08-16 NOTE — NURSING NOTE
[address] [address]  Dept: [phone]  ______________________________________________________________________________    Patient: Jose Antonio Brown   : 1960   MRN: [id number]   2021    INVASIVE PROCEDURE SAFETY CHECKLIST    Date: 2021   Procedure: Right knee kenalog injection  Patient Name: Jose Antonio Brown  MRN: [id number]  YOB: 1960    Action: Complete sections as appropriate. Any discrepancy results in a HARD COPY until resolved.     PRE PROCEDURE:  Patient ID verified with 2 identifiers (name and  or MRN): Yes  Procedure and site verified with patient/designee (when able): Yes  Accurate consent documentation in medical record: Yes  H&P (or appropriate assessment) documented in medical record: Yes  H&P must be up to 20 days prior to procedure and updates within 24 hours of procedure as applicable: NA  Relevant diagnostic and radiology test results appropriately labeled and displayed as applicable: Yes  Procedure site(s) marked with provider initials: NA    TIMEOUT:  Time-Out performed immediately prior to starting procedure, including verbal and active participation of all team members addressing the following:Yes  * Correct patient identify  * Confirmed that the correct side and site are marked  * An accurate procedure consent form  * Agreement on the procedure to be done  * Correct patient position  * Relevant images and results are properly labeled and appropriately displayed  * The need to administer antibiotics or fluids for irrigation purposes during the procedure as applicable   * Safety precautions based on patient history or medication use    DURING PROCEDURE: Verification of correct person, site, and procedures any time the responsibility for care of the patient is transferred to another member of the care team.       Prior to injection, verified patient identity using patient's name and date of  birth.  Due to injection administration, patient instructed to remain in clinic for 15 minutes  afterwards, and to report any adverse reaction to me immediately.    Joint injection was performed.      Drug Amount Wasted:  Yes: 42 mg/ml   Vial/Syringe: Single dose vial  Expiration Date:  05/2024      Diana Mendes ATC  August 16, 2021

## 2021-08-16 NOTE — LETTER
8/16/2021         RE: Jose Antonio Brown  [address] [place] [id number]        Dear Colleague,    Thank you for referring your patient, Jose Antonio Brown, to the [facility] [place]. Please see a copy of my visit note below.    CHIEF CONCERN: right knee pain     HISTORY:   61M here for evaluation of right knee. When sits for prolonged period, feels tightness in back of knee mainly on lateral > medial joint line and anterior knee below knee cap. Occasional swelling. No catching or locking. No christiane injuries. No numbness or tingling. No injections. No braces. Temporary relief with knee compression sleeve. No PT.     PAST MEDICAL HISTORY: (Reviewed with the patient and in the [facility] medical record)  1. No pertinent     PAST SURGICAL HISTORY: (Reviewed with the patient and in the [facility] medical record)  1. None     MEDICATIONS: (Reviewed with the patient and in the [facility] medical record)    Notable medications include: none     ALLERGIES: (Reviewed with the patient and in the [facility] medical record)  1. No drug allergies known       SOCIAL HISTORY: (Reviewed with the patient and in the medical record)  --Tobacco: never   --Occupation:  [employer]  --Avocation/Sport: tennis, softball, cycling     FAMILY HISTORY: (Reviewed with the patient and in the medical record)  -- No family history of bleeding, clotting, or difficulty with anesthesia    REVIEW OF SYSTEMS: (Reviewed with the patient and on the health intake form)  -- A comprehensive 10 point review of systems was conducted and is negative except as noted in the HPI    EXAM:     General: Awake, Alert and Oriented, No acute Distress. Articulate and Interactive    Body mass index is 29.16 kg/m .    Right Lower extremity :    Skin is Warm and Well perfused, no suggestion of infection    No scars      ROM 0-100 active and passive (0-125 on contralateral)    Stable to varus/valgus stress test at 0 and 30  knee flexion     No effusion     ttp lateral joint line, posteromedial joint line    +Mariah, tender with hyperflexion     +patellar grind     EHL/FHL/TA/GS 5/5    Sensation intact L3-S1    2+ Dorsalis Pedis Pulse    IMAGING:    MRI:   Right knee MRI w/o contrast 8/14/21  1. Progression body posterior horn medial meniscal tear.  2. 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    a. Mixed interval change of grade IV chondromalacia in the  patellofemoral compartment with area of improvement and worsening.   b. No substantial change grade III chondromalacia of the lateral  compartment.  3. Likely chronic sprain fibular collateral ligament, posterolateral  capsule and medial supporting structures, similar to prior.  4. Free edge tear posterior horn lateral meniscus, similar to prior.    ASSESSMENT:  61M with right knee pathology as below   1. posterior horn medial meniscal tear  2. 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 Grade IV chondromalacia of patellofemoral compartmetn   4. Free edge posterior horn tear of lateral meniscus     Reviewed clinical findings and imaging with the patient. He has underlying arthritis in all compartments. His pain seems to be localized to medial > lateral joint line on exam. Reviewed management options including operative and nonoperative options. Operative option would include arthroscopy with partial medial and lateral meniscectomy. Risks and benefits discussed. nonoperative option includes corticosteroid injection, PT, brace, oral nsaids. He prefers to proceed with nonoperative route and steroid injection today.      PLAN:  1. Corticosteroid injection administered to right knee   2. Will follow up as needed for future injections or procedure as above     Procedure Note:    Consent was signed.  Site and side was verified with the patient.  Allergies were verified with patient.  Right knee was prepped and  draped in a sterile fashion.  1 cc of 1% lidocaine and 40 mg of kenalog was injected into the right knee.  Pt tolerated the procedure well, noted immediate improvement in pain and ROM after injection.    Ariana Nguyen MD   Orthopedic Surgery, PGY5    Answers for HPI/ROS submitted by the patient on 8/14/2021  General Symptoms: No  Skin Symptoms: No  HENT Symptoms: No  EYE SYMPTOMS: No  HEART SYMPTOMS: No  LUNG SYMPTOMS: No  INTESTINAL SYMPTOMS: No  URINARY SYMPTOMS: No  REPRODUCTIVE SYMPTOMS: No  SKELETAL SYMPTOMS: Yes  BLOOD SYMPTOMS: No  NERVOUS SYSTEM SYMPTOMS: Yes  MENTAL HEALTH SYMPTOMS: No  Back pain: Yes  Muscle aches: Yes  Neck pain: No  Swollen joints: Yes  Joint pain: Yes  Bone pain: No  Muscle cramps: Yes  Muscle weakness: Yes  Joint stiffness: Yes  Bone fracture: No  Trouble with coordination: No  Dizziness or trouble with balance: No  Fainting or black-out spells: No  Memory loss: No  Headache: No  Seizures: No  Speech problems: No  Tingling: Yes  Tremor: No  Weakness: Yes  Difficulty walking: Yes  Paralysis: No  Numbness: Yes            Large Joint Injection: R knee joint    Date/Time: 8/16/2021 11:57 AM  Performed by: Jose Antonio Hermosillo MD  Authorized by: Jose Antonio Hermosillo MD     Indications:  Pain  Needle Size:  21 G  Guidance: landmark guided    Approach:  Anterolateral  Location:  Knee      Medications:  40 mg triamcinolone 40 MG/ML; 8 mL lidocaine (PF) 0.5 %  Outcome:  Tolerated well, no immediate complications  Procedure discussed: discussed risks, benefits, and alternatives    Consent Given by:  Patient  Timeout: timeout called immediately prior to procedure    Prep: patient was prepped and draped in usual sterile fashion            Patient seen and examined with the resident.     Assesment: medial compartment oa and medial meniscus tear    Plan: options reviewed including operative and nonop. Together we decided to proceed with nonop management including HEP and  steroid injection. He will return to clinic if he wants to have a repeat injection.  He is allowed to have 2 to 3/year.  No lifetime maximum.  If would like to discuss surgery which would be an arthroscopic meniscectomy he can return to clinic to discuss this as well.  He understands that there will be no surgery within 6 weeks of an injection.    After written informed consent obtained and signed, after sufficient prepping and sterile technique, 40 mg of kenalog and , 8 cc of 1% lidocaine were injected without complication into the right knee. The patient tolerated the injection well and a sterile dressing was applied.    I did specifically discussed the patient the arthroscopic procedure will not help the portion of his symptoms coming from arthritis and that ultimately he may need to consider knee replacement surgery at some time the future if he fails to see lasting improvement from nonsurgical means.    I do think would be beneficial to get updated radiographs if and when he returns to see us.  This could be a regular knee series.    I agree with history, physical and imaging as well as the assessment and plan as detailed by Dr. Nguyen.         Again, thank you for allowing me to participate in the care of your patient.        Sincerely,        Jose Antonio Hermosillo MD

## 2021-10-09 ENCOUNTER — HEALTH MAINTENANCE LETTER (OUTPATIENT)
Age: 61
End: 2021-10-09

## 2021-12-21 ENCOUNTER — TRANSFERRED RECORDS (OUTPATIENT)
Dept: HEALTH INFORMATION MANAGEMENT | Facility: CLINIC | Age: 61
End: 2021-12-21
Payer: COMMERCIAL

## 2021-12-23 ENCOUNTER — TRANSCRIBE ORDERS (OUTPATIENT)
Dept: OTHER | Age: 61
End: 2021-12-23
Payer: COMMERCIAL

## 2021-12-23 DIAGNOSIS — G89.29 CHRONIC PAIN OF RIGHT KNEE: Primary | ICD-10-CM

## 2021-12-23 DIAGNOSIS — M25.561 CHRONIC PAIN OF RIGHT KNEE: Primary | ICD-10-CM

## 2022-01-07 ENCOUNTER — OFFICE VISIT (OUTPATIENT)
Dept: ORTHOPEDICS | Facility: CLINIC | Age: 62
End: 2022-01-07
Payer: COMMERCIAL

## 2022-01-07 VITALS — HEIGHT: 72 IN | WEIGHT: 211 LBS | BODY MASS INDEX: 28.58 KG/M2

## 2022-01-07 DIAGNOSIS — M17.11 PRIMARY OSTEOARTHRITIS OF RIGHT KNEE: Primary | ICD-10-CM

## 2022-01-07 DIAGNOSIS — M25.561 CHRONIC PAIN OF RIGHT KNEE: ICD-10-CM

## 2022-01-07 DIAGNOSIS — G89.29 CHRONIC PAIN OF RIGHT KNEE: ICD-10-CM

## 2022-01-07 PROCEDURE — 99203 OFFICE O/P NEW LOW 30 MIN: CPT | Mod: 25 | Performed by: FAMILY MEDICINE

## 2022-01-07 PROCEDURE — 20611 DRAIN/INJ JOINT/BURSA W/US: CPT | Mod: RT | Performed by: FAMILY MEDICINE

## 2022-01-07 RX ORDER — LIDOCAINE HYDROCHLORIDE 10 MG/ML
7 INJECTION, SOLUTION EPIDURAL; INFILTRATION; INTRACAUDAL; PERINEURAL
Status: SHIPPED | OUTPATIENT
Start: 2022-01-07

## 2022-01-07 RX ORDER — TRIAMCINOLONE ACETONIDE 40 MG/ML
40 INJECTION, SUSPENSION INTRA-ARTICULAR; INTRAMUSCULAR
Status: SHIPPED | OUTPATIENT
Start: 2022-01-07

## 2022-01-07 RX ADMIN — TRIAMCINOLONE ACETONIDE 40 MG: 40 INJECTION, SUSPENSION INTRA-ARTICULAR; INTRAMUSCULAR at 12:18

## 2022-01-07 RX ADMIN — LIDOCAINE HYDROCHLORIDE 7 ML: 10 INJECTION, SOLUTION EPIDURAL; INFILTRATION; INTRACAUDAL; PERINEURAL at 12:18

## 2022-01-07 ASSESSMENT — MIFFLIN-ST. JEOR: SCORE: 1805.09

## 2022-01-07 NOTE — NURSING NOTE
[address] [address]  Dept: [phone]  ______________________________________________________________________________    Patient: Jose Antonio Brown   : 1960   MRN: [id number]   2022    INVASIVE PROCEDURE SAFETY CHECKLIST    Date: 21   Procedure: Right knee aspiration and kenalog injection  Patient Name: Jose Antonio Brown  MRN: [id number]  YOB: 1960    Action: Complete sections as appropriate. Any discrepancy results in a HARD COPY until resolved.     PRE PROCEDURE:  Patient ID verified with 2 identifiers (name and  or MRN): Yes  Procedure and site verified with patient/designee (when able): Yes  Accurate consent documentation in medical record: Yes  H&P (or appropriate assessment) documented in medical record: Yes  H&P must be up to 20 days prior to procedure and updates within 24 hours of procedure as applicable: NA  Relevant diagnostic and radiology test results appropriately labeled and displayed as applicable: Yes  Procedure site(s) marked with provider initials: NA    TIMEOUT:  Time-Out performed immediately prior to starting procedure, including verbal and active participation of all team members addressing the following:Yes  * Correct patient identify  * Confirmed that the correct side and site are marked  * An accurate procedure consent form  * Agreement on the procedure to be done  * Correct patient position  * Relevant images and results are properly labeled and appropriately displayed  * The need to administer antibiotics or fluids for irrigation purposes during the procedure as applicable   * Safety precautions based on patient history or medication use    DURING PROCEDURE: Verification of correct person, site, and procedures any time the responsibility for care of the patient is transferred to another member of the care team.       Prior to injection, verified patient identity using patient's name  and date of birth.  Due to injection administration, patient instructed to remain in clinic for 15 minutes  afterwards, and to report any adverse reaction to me immediately.    Joint injection was performed.   and aspiration    Drug Amount Wasted:  Yes: 3 mg/ml   Vial/Syringe: Single dose vial  Expiration Date:  09/2025      Diana Mendes ATC  January 7, 2022

## 2022-01-07 NOTE — PROGRESS NOTES
CHIEF COMPLAINT:  Consult (Right knee)       HISTORY OF PRESENT ILLNESS  Mr. Brown is a pleasant 61 year old year old male who presents to clinic today with right knee.  Jose Antonio explains that about 3 years ago he had issues with his left knee and it was aspirated. In the last year he has noticed a similar issue with his right knee.     Onset: gradual  Location: right anterior knee  Quality:  sharp and throbing  Duration: 1 years   Severity: 7/10 at worst  Timing:constant  Modifying factors:  resting and non-use makes it better, movement and use makes it worse  Associated signs & symptoms: pain, swelling and instability   Previous similar pain: Yes, left knee about 3 years ago    Additional history: Previous history of left knee exacerbations due to osteoarthritis which were quite successful with aspiration and corticosteroid injections.  His right knee pain increased over the summer and MRI did reveal osteoarthritis, degenerative meniscus tear.  Saw  and he recommended considering surgery about an injection may be best for step.  This was on 8/16/2021.  Relief only lasted 1 to 2 days unfortunately.  He is also tried ice, lidocaine patches.    Review of Systems:    Have you recently had a a fever, chills, weight loss? No    Do you have any vision problems? No    Do you have any chest pain or edema? No    Do you have any shortness of breath or wheezing?  No    Do you have stomach problems? No    Do you have any numbness or focal weakness? No    Do you have diabetes? No    Do you have problems with bleeding or clotting? No    Do you have an rashes or other skin lesions? No    MEDICAL HISTORY  Patient Active Problem List   Diagnosis   (none) - all problems resolved or deleted       Current Outpatient Medications   Medication Sig Dispense Refill     atropine 1 % ophthalmic solution Place 1 drop into the right eye 2 times daily       cetirizine (ZYRTEC) 10 MG tablet Take 10 mg by mouth       fluticasone  (FLONASE) 50 MCG/ACT nasal spray Spray 2 sprays in nostril       meloxicam (MOBIC) 15 MG tablet Take 1 tablet (15 mg) by mouth daily 30 tablet 0     prednisoLONE acetate (PRED FORTE) 1 % ophthalmic suspension Place 1-2 drops into the right eye 2 times daily 10 mL 0       No Known Allergies    Family History   Problem Relation Age of Onset     Glaucoma No family hx of      Macular Degeneration No family hx of        Additional medical/Social/Surgical histories reviewed in [facility] and updated as appropriate.       PHYSICAL EXAM  Ht 1.829 m (6')   Wt 95.7 kg (211 lb)   BMI 28.62 kg/m      General  - normal appearance, in no obvious distress  HEENT  - conjunctivae not injected, moist mucous membranes  CV  - normal popliteal pulse  Pulm  - normal respiratory pattern, non-labored  Musculoskeletal - Right knee  - stance: mildly antalgic gait  - inspection: generalized swelling, moderate effusion  - palpation: medial joint line tenderness, patella and patellar tendon non-tender, normal popliteal pulse  - ROM: 120 degrees flexion, 0 degrees extension, painful active ROM  - strength: 5/5 in flexion, 5/5 in extension  - special tests:  (-) Lachman  (-) anterior drawer  (-) Mariah  (-) varus at 0 and 30 degrees flexion  (-) valgus at 0 and 30 degrees flexion  Neuro  - no sensory or motor deficit, grossly normal coordination, normal muscle tone  Skin  - no ecchymosis, erythema, warmth, or induration, no obvious rash  Psych  - interactive, appropriate, normal mood and affect    IMAGING :   MR knee right without contrast    Impression:  1. Progression body posterior horn medial meniscal tear.  2. 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    a. Mixed interval change of grade IV chondromalacia in the  patellofemoral compartment with area of improvement and worsening.   b. No substantial change grade III chondromalacia of the lateral  compartment.  3. Likely chronic sprain  fibular collateral ligament, posterolateral  capsule and medial supporting structures, similar to prior.  4. Free edge tear posterior horn lateral meniscus, similar to prior.     MATT WEBSTER      ASSESSMENT & PLAN  Mr. Brown is a 61 year old year old male who presents to clinic today with chronic right knee pain secondary to osteoarthritis as well as degenerative meniscus tear.    Diagnosis: Primary osteoarthritis of right knee    Treatment options discussed as time we agreed to proceed with right knee aspiration given moderate level effusion.  He would like to repeat trial of corticosteroid to see if it is more helpful after aspiration I do think this is a reasonable idea.  He has had good relief from left knee corticosteroid in the past.  Otherwise we did discuss considerations for Zilretta injections as an option, but I would only recommend this if he does get it least 1 to 2 months of relief with triamcinolone.  Unfortunately the last injection only lasted 1 to 2 days.    He will continue to use knee sleeve/brace and I recommended returning to low impact exercise.    Knee Aspiration and Injection - Ultrasound Guided  The patient was informed of the risks and the benefits of the procedure and a written consent was signed.  The patient s right  knee was prepped with chlorhexidine in sterile fashion.   40 mg of triamcinolone suspension was drawn up into a 5 mL syringe with 4 mL of 1% lidocaine.  Procedure was performed using sterile technique.  Local anesthesia was performed using a 27-gauge 1.5-inch needle to administer 3 mL of 1% lidocaine without epi.  Under ultrasound guidance a 1.5-inch 18-gauge needle on a 30 cc syringe was used to enter the superolateral aspect of the knee.  Needle placement was visualized and documented with ultrasound.  Ultrasound visualization was necessary due to ensure proper placement of needle in to the effusion, as well as resolution of effusion once aspiration was completed.   30 cc of yellow, translucent joint fluid was aspirated.  Syringe was then swapped for the 5 mL syringe containing the corticosteroid injectate, holding the needle in place with a sterile hemostat.  Injection was performed successfully, without difficulty.  Images were permanently stored for the patient's record.  There were no complications. The patient tolerated the procedure well. There was negligible bleeding.   The patient was instructed to ice the knee upon leaving clinic and refrain from overuse over the next 3 days.   The patient was instructed to call or go to the emergency room with any unusual pain, swelling, redness, or if otherwise concerned.    Large Joint Injection/Arthocentesis: R knee joint    Date/Time: 1/7/2022 12:18 PM  Performed by: Natalio Hall DO  Authorized by: Natalio Hall DO     Indications:  Pain and joint swelling  Needle Size:  22 G  Guidance: ultrasound    Approach:  Lateral  Location:  Knee      Medications:  40 mg triamcinolone 40 MG/ML; 7 mL lidocaine (PF) 1 %  Aspirate amount (mL):  40  Aspirate:  Yellow  Outcome:  Tolerated well, no immediate complications  Procedure discussed: discussed risks, benefits, and alternatives    Consent Given by:  Patient  Timeout: timeout called immediately prior to procedure    Prep: patient was prepped and draped in usual sterile fashion          It was a pleasure seeing Jose Antonio today.    Natalio Hall DO, [facility]  Primary Care Sports Medicine

## 2022-01-07 NOTE — LETTER
1/7/2022      RE: Jose Antonio Brown  [address]  [facility] [address]       CHIEF COMPLAINT:  Consult (Right knee)       HISTORY OF PRESENT ILLNESS  Mr. Brown is a pleasant 61 year old year old male who presents to clinic today with right knee.  Jose Antonio explains that about 3 years ago he had issues with his left knee and it was aspirated. In the last year he has noticed a similar issue with his right knee.     Onset: gradual  Location: right anterior knee  Quality:  sharp and throbing  Duration: 1 years   Severity: 7/10 at worst  Timing:constant  Modifying factors:  resting and non-use makes it better, movement and use makes it worse  Associated signs & symptoms: pain, swelling and instability   Previous similar pain: Yes, left knee about 3 years ago    Additional history: Previous history of left knee exacerbations due to osteoarthritis which were quite successful with aspiration and corticosteroid injections.  His right knee pain increased over the summer and MRI did reveal osteoarthritis, degenerative meniscus tear.  Saw  and he recommended considering surgery about an injection may be best for step.  This was on 8/16/2021.  Relief only lasted 1 to 2 days unfortunately.  He is also tried ice, lidocaine patches.    Review of Systems:    Have you recently had a a fever, chills, weight loss? No    Do you have any vision problems? No    Do you have any chest pain or edema? No    Do you have any shortness of breath or wheezing?  No    Do you have stomach problems? No    Do you have any numbness or focal weakness? No    Do you have diabetes? No    Do you have problems with bleeding or clotting? No    Do you have an rashes or other skin lesions? No    MEDICAL HISTORY  Patient Active Problem List   Diagnosis   (none) - all problems resolved or deleted       Current Outpatient Medications   Medication Sig Dispense Refill     atropine 1 % ophthalmic solution Place 1 drop into the right eye  2 times daily       cetirizine (ZYRTEC) 10 MG tablet Take 10 mg by mouth       fluticasone (FLONASE) 50 MCG/ACT nasal spray Spray 2 sprays in nostril       meloxicam (MOBIC) 15 MG tablet Take 1 tablet (15 mg) by mouth daily 30 tablet 0     prednisoLONE acetate (PRED FORTE) 1 % ophthalmic suspension Place 1-2 drops into the right eye 2 times daily 10 mL 0       No Known Allergies    Family History   Problem Relation Age of Onset     Glaucoma No family hx of      Macular Degeneration No family hx of        Additional medical/Social/Surgical histories reviewed in [facility] and updated as appropriate.       PHYSICAL EXAM  Ht 1.829 m (6')   Wt 95.7 kg (211 lb)   BMI 28.62 kg/m      General  - normal appearance, in no obvious distress  HEENT  - conjunctivae not injected, moist mucous membranes  CV  - normal popliteal pulse  Pulm  - normal respiratory pattern, non-labored  Musculoskeletal - Right knee  - stance: mildly antalgic gait  - inspection: generalized swelling, moderate effusion  - palpation: medial joint line tenderness, patella and patellar tendon non-tender, normal popliteal pulse  - ROM: 120 degrees flexion, 0 degrees extension, painful active ROM  - strength: 5/5 in flexion, 5/5 in extension  - special tests:  (-) Lachman  (-) anterior drawer  (-) Mariah  (-) varus at 0 and 30 degrees flexion  (-) valgus at 0 and 30 degrees flexion  Neuro  - no sensory or motor deficit, grossly normal coordination, normal muscle tone  Skin  - no ecchymosis, erythema, warmth, or induration, no obvious rash  Psych  - interactive, appropriate, normal mood and affect    IMAGING :   MR knee right without contrast    Impression:  1. Progression body posterior horn medial meniscal tear.  2. 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    a. Mixed interval change of grade IV chondromalacia in the  patellofemoral compartment with area of improvement and worsening.   b. No  substantial change grade III chondromalacia of the lateral  compartment.  3. Likely chronic sprain fibular collateral ligament, posterolateral  capsule and medial supporting structures, similar to prior.  4. Free edge tear posterior horn lateral meniscus, similar to prior.     MATT WEBSTER      ASSESSMENT & PLAN  Mr. Brown is a 61 year old year old male who presents to clinic today with chronic right knee pain secondary to osteoarthritis as well as degenerative meniscus tear.    Diagnosis: Primary osteoarthritis of right knee    Treatment options discussed as time we agreed to proceed with right knee aspiration given moderate level effusion.  He would like to repeat trial of corticosteroid to see if it is more helpful after aspiration I do think this is a reasonable idea.  He has had good relief from left knee corticosteroid in the past.  Otherwise we did discuss considerations for Zilretta injections as an option, but I would only recommend this if he does get it least 1 to 2 months of relief with triamcinolone.  Unfortunately the last injection only lasted 1 to 2 days.    He will continue to use knee sleeve/brace and I recommended returning to low impact exercise.    Knee Aspiration and Injection - Ultrasound Guided  The patient was informed of the risks and the benefits of the procedure and a written consent was signed.  The patient s right  knee was prepped with chlorhexidine in sterile fashion.   40 mg of triamcinolone suspension was drawn up into a 5 mL syringe with 4 mL of 1% lidocaine.  Procedure was performed using sterile technique.  Local anesthesia was performed using a 27-gauge 1.5-inch needle to administer 3 mL of 1% lidocaine without epi.  Under ultrasound guidance a 1.5-inch 18-gauge needle on a 30 cc syringe was used to enter the superolateral aspect of the knee.  Needle placement was visualized and documented with ultrasound.  Ultrasound visualization was necessary due to ensure proper  placement of needle in to the effusion, as well as resolution of effusion once aspiration was completed.  30 cc of yellow, translucent joint fluid was aspirated.  Syringe was then swapped for the 5 mL syringe containing the corticosteroid injectate, holding the needle in place with a sterile hemostat.  Injection was performed successfully, without difficulty.  Images were permanently stored for the patient's record.  There were no complications. The patient tolerated the procedure well. There was negligible bleeding.   The patient was instructed to ice the knee upon leaving clinic and refrain from overuse over the next 3 days.   The patient was instructed to call or go to the emergency room with any unusual pain, swelling, redness, or if otherwise concerned.    Large Joint Injection/Arthocentesis: R knee joint    Date/Time: 1/7/2022 12:18 PM  Performed by: Natalio Hall DO  Authorized by: Natalio Hall DO     Indications:  Pain and joint swelling  Needle Size:  22 G  Guidance: ultrasound    Approach:  Lateral  Location:  Knee      Medications:  40 mg triamcinolone 40 MG/ML; 7 mL lidocaine (PF) 1 %  Aspirate amount (mL):  40  Aspirate:  Yellow  Outcome:  Tolerated well, no immediate complications  Procedure discussed: discussed risks, benefits, and alternatives    Consent Given by:  Patient  Timeout: timeout called immediately prior to procedure    Prep: patient was prepped and draped in usual sterile fashion          It was a pleasure seeing Jose Antonio today.    Natalio Hall DO, [facility]  Primary Care Sports Medicine        Natalio Hall DO

## 2022-01-29 ENCOUNTER — HEALTH MAINTENANCE LETTER (OUTPATIENT)
Age: 62
End: 2022-01-29

## 2022-08-08 ENCOUNTER — LAB REQUISITION (OUTPATIENT)
Dept: LAB | Facility: CLINIC | Age: 62
End: 2022-08-08
Payer: COMMERCIAL

## 2022-08-08 DIAGNOSIS — Z00.00 ENCOUNTER FOR GENERAL ADULT MEDICAL EXAMINATION WITHOUT ABNORMAL FINDINGS: ICD-10-CM

## 2022-08-08 LAB
CHOLEST SERPL-MCNC: 201 MG/DL
HDLC SERPL-MCNC: 55 MG/DL
LDLC SERPL CALC-MCNC: 135 MG/DL
NONHDLC SERPL-MCNC: 146 MG/DL
TRIGL SERPL-MCNC: 56 MG/DL

## 2022-08-08 PROCEDURE — 80061 LIPID PANEL: CPT | Mod: ORL | Performed by: FAMILY MEDICINE

## 2022-09-11 ENCOUNTER — HEALTH MAINTENANCE LETTER (OUTPATIENT)
Age: 62
End: 2022-09-11

## 2023-10-07 ENCOUNTER — HEALTH MAINTENANCE LETTER (OUTPATIENT)
Age: 63
End: 2023-10-07

## 2023-11-03 NOTE — PROGRESS NOTES
"  1.  Reason for the visit:  Consult to discuss snoring and waking up not feeling rested  2.  Referring provider and clinic name:  [facility]  3.  Previous Sleep Doctor or Pulmonlogist (clinic name)?  None   4.  Records, Procedures, Imaging, and Labs (see below)  No records to obtain        All NOTES from previous office visits that pertain to why they are being seen in the Sleep Center    Previous Sleep Studies, Chest CT, Echos and reports that pertain to why they are seeing Sleep Center    All Sleep records that have been done in the last 2 years that pertain to why they are seeing Sleep Center            Are they being seen for continuation of care for Cpap/Bipap/Avap/Trilogy/Dental Device? none    If yes to above Who and Where was Device issued/currently getting supplies from? na    Are you currently on \"Supplemental Oxygen\" during the day or night?   na                                                                                                                                                      Please remind pt to bring Cpap machine and ask to arrive 15 minutes early to appointment due traffic and congestion                                                 5. Pt Sleep Center Packet received Message left asking pt to arrive 30 minutes early to appointment if no packet received.    Snores, loud, flonase helps, strips helped,  ESS?daytime consequences?    Referral # Creation Date Referral Status Status Update    [id number] 04/29/2019 Authorized 04/29/2019: Status History   Status Reason Referral Type Referral Reasons Referral Class   No Approval Necessary - Patient Tracking none none none   To Specialty To Provider To Location/Place of Service To Department   Nurse Practitioner Tri Marr APRN CNP [facility]   To Vendor Referred By By Location/Place of Service By Department   none none none none   Priority Start Date Expiration Date Referral " "Entered By   Routine 05/07/2019 05/06/2020 Maribeth Spaulding   Visits Requested Visits Authorized Visits Completed Visits Scheduled   1 1  1   Procedure Information     Free Text Procedure Description    NEW REFERRED OUTSIDE [place]            Diagnosis Information     Diagnosis Description   [place] [facility], snoring, no study or machine   Referral Notes   Number of Notes: 2   Type Date User Summary Attachment   [facility] 05/01/2019  7:57 AM Joselin Kaiser - -   Note    Re-verified insurance; verified insurance is active for current month.               Type Date User Summary Attachment   [facility] 04/29/2019 10:21 AM Maribeth Spaulding - -   Note    PB DOS:                                 5/7/2019  Payor:                                     CHANDANA/CHANDANA LAONSO     [facility] Clearance Note  Visit Description:                  SLEEP EVALUATION & MANAGEMENT REFERRAL     Benefits Contact:                    Benefits Phone:                      N/A  Reference Number:                N/A     Assigned Clinic:                      [facility], no referral required  Referral Number:                    N/A  Referral Date Range:              N/A  Number of Visits:                    N/A     Procedures  Actigraphy:                              Non-Covered     Oximetry:                                 Covered            HST/PSG:                               HST non-covered, PSG no policy  MSLT/MWT:                            No Policy           WatchPat:                               Call                                  Yes: \"please make sure that you bring this to your appointment completed, either the doctor will not see you until this completed or you may be asked to reschedule your appointment.\"     No: mail or email to the pt and explain, \"please make sure that you bring this to your appointment completed, either the doctor will not see you until this completed or you may be asked to reschedule your appointment.\"     ~If pt coming early to fill packet out, " Below [place] "ask that they come 30 minutes prior to their appointment~     6. Has the pt's medication list been updated and preferred pharmacy added?     7. Has the allergy list been reviewed?    \"Thank you for choosing [facility] and we look forward to seeing you at your upcoming appointment\"     CC:***    HPI:Symptoms:***        Schedule:***      Studies:***            Personal, social and Family hx:  ...Scanned in sleep consult note reviewed:    ROS      Allergies:    No Known Allergies    Medications:    Current Outpatient Medications   Medication Sig Dispense Refill     cetirizine (ZYRTEC) 10 MG tablet Take 10 mg by mouth       fluticasone (FLONASE) 50 MCG/ACT nasal spray Spray 2 sprays in nostril       meloxicam (MOBIC) 15 MG tablet Take 1 tablet (15 mg) by mouth daily 30 tablet 0       Problem List:  There are no active problems to display for this patient.       Past Medical/Surgical History:  No past medical history on file.  No past surgical history on file.    No family history on file.  Social History     Socioeconomic History     Marital status: Single     Spouse name: Not on file     Number of children: Not on file     Years of education: Not on file     Highest education level: Not on file   Occupational History     Not on file   Social Needs     Financial resource strain: Not on file     Food insecurity:     Worry: Not on file     Inability: Not on file     Transportation needs:     Medical: Not on file     Non-medical: Not on file   Tobacco Use     Smoking status: Never Smoker     Smokeless tobacco: Never Used   Substance and Sexual Activity     Alcohol use: Not on file     Drug use: Not on file     Sexual activity: Not on file   Lifestyle     Physical activity:     Days per week: Not on file     Minutes per session: Not on file     Stress: Not on file   Relationships     Social connections:     Talks on phone: Not on file     Gets together: Not on file     Attends Mandaen service: Not on file     " Active member of club or organization: Not on file     Attends meetings of clubs or organizations: Not on file     Relationship status: Not on file     Intimate partner violence:     Fear of current or ex partner: Not on file     Emotionally abused: Not on file     Physically abused: Not on file     Forced sexual activity: Not on file   Other Topics Concern     Not on file   Social History Narrative     Not on file       Physical Examination:  Vitals: /68   Pulse 86   Resp 16   Ht 1.829 m (6')   Wt 98.4 kg (217 lb)   SpO2 94%   BMI 29.43 kg/m    BMI= Body mass index is 29.43 kg/m .    Neck Cir (cm): 39 cm    [place] Total Score 5/7/2019   Total score - [place] 2     Mal iv, nl  GENERAL APPEARANCE: healthy, alert and no distress  EYES: Eyes grossly normal to inspection  HENT: soft palate dependent, tongue base enlarged and nares patent  NECK: thyroid normal to palpation  RESP: lungs clear to auscultation - no rales, rhonchi or wheezes  CV: regular rates and rhythm, normal S1 S2, no S3 or S4 and no murmur, click or rub  Extremities are without clubbing, edema  Musculoskeletal:Moves without difficulty  Mallampati Class: IV.  Tonsillar Stage: 1  hidden by pillars.  Dental: Dentition in good condition.  Good advancement of lower jaw without tmd  Skin: without facial rash

## 2024-02-27 ENCOUNTER — LAB REQUISITION (OUTPATIENT)
Dept: LAB | Facility: CLINIC | Age: 64
End: 2024-02-27
Payer: COMMERCIAL

## 2024-02-27 DIAGNOSIS — Z00.00 ENCOUNTER FOR GENERAL ADULT MEDICAL EXAMINATION WITHOUT ABNORMAL FINDINGS: ICD-10-CM

## 2024-02-27 LAB — HBA1C MFR BLD: 5.5 %

## 2024-02-27 PROCEDURE — 80053 COMPREHEN METABOLIC PANEL: CPT | Mod: ORL | Performed by: INTERNAL MEDICINE

## 2024-02-27 PROCEDURE — 83036 HEMOGLOBIN GLYCOSYLATED A1C: CPT | Mod: ORL | Performed by: INTERNAL MEDICINE

## 2024-02-27 PROCEDURE — 80061 LIPID PANEL: CPT | Mod: ORL | Performed by: INTERNAL MEDICINE

## 2024-02-28 ENCOUNTER — TRANSCRIBE ORDERS (OUTPATIENT)
Dept: OTHER | Age: 64
End: 2024-02-28

## 2024-02-28 DIAGNOSIS — Z12.11 COLON CANCER SCREENING: Primary | ICD-10-CM

## 2024-02-28 LAB
ALBUMIN SERPL BCG-MCNC: 3.4 G/DL (ref 3.5–5.2)
ALP SERPL-CCNC: 52 U/L (ref 40–150)
ALT SERPL W P-5'-P-CCNC: 28 U/L (ref 0–70)
ANION GAP SERPL CALCULATED.3IONS-SCNC: 10 MMOL/L (ref 7–15)
AST SERPL W P-5'-P-CCNC: 27 U/L (ref 0–45)
BILIRUB SERPL-MCNC: 0.3 MG/DL
BUN SERPL-MCNC: 16.8 MG/DL (ref 8–23)
CALCIUM SERPL-MCNC: 8.7 MG/DL (ref 8.8–10.2)
CHLORIDE SERPL-SCNC: 107 MMOL/L (ref 98–107)
CHOLEST SERPL-MCNC: 175 MG/DL
CREAT SERPL-MCNC: 1.53 MG/DL (ref 0.67–1.17)
DEPRECATED HCO3 PLAS-SCNC: 25 MMOL/L (ref 22–29)
EGFRCR SERPLBLD CKD-EPI 2021: 51 ML/MIN/1.73M2
FASTING STATUS PATIENT QL REPORTED: NO
GLUCOSE SERPL-MCNC: 139 MG/DL (ref 70–99)
HDLC SERPL-MCNC: 40 MG/DL
LDLC SERPL CALC-MCNC: 100 MG/DL
NONHDLC SERPL-MCNC: 135 MG/DL
POTASSIUM SERPL-SCNC: 3.6 MMOL/L (ref 3.4–5.3)
PROT SERPL-MCNC: 5.3 G/DL (ref 6.4–8.3)
SODIUM SERPL-SCNC: 142 MMOL/L (ref 135–145)
TRIGL SERPL-MCNC: 177 MG/DL

## (undated) RX ORDER — TRIAMCINOLONE ACETONIDE 40 MG/ML
INJECTION, SUSPENSION INTRA-ARTICULAR; INTRAMUSCULAR
Status: DISPENSED
Start: 2021-08-16

## (undated) RX ORDER — LIDOCAINE HYDROCHLORIDE 5 MG/ML
INJECTION, SOLUTION INFILTRATION; INTRAVENOUS
Status: DISPENSED
Start: 2021-08-16

## (undated) RX ORDER — LIDOCAINE HYDROCHLORIDE 10 MG/ML
INJECTION, SOLUTION EPIDURAL; INFILTRATION; INTRACAUDAL; PERINEURAL
Status: DISPENSED
Start: 2022-01-07

## (undated) RX ORDER — LIDOCAINE HYDROCHLORIDE 10 MG/ML
INJECTION, SOLUTION INFILTRATION; PERINEURAL
Status: DISPENSED
Start: 2017-12-15

## (undated) RX ORDER — TRIAMCINOLONE ACETONIDE 40 MG/ML
INJECTION, SUSPENSION INTRA-ARTICULAR; INTRAMUSCULAR
Status: DISPENSED
Start: 2018-01-23

## (undated) RX ORDER — LIDOCAINE HYDROCHLORIDE 10 MG/ML
INJECTION, SOLUTION INFILTRATION; PERINEURAL
Status: DISPENSED
Start: 2017-12-23

## (undated) RX ORDER — TRIAMCINOLONE ACETONIDE 40 MG/ML
INJECTION, SUSPENSION INTRA-ARTICULAR; INTRAMUSCULAR
Status: DISPENSED
Start: 2017-12-15

## (undated) RX ORDER — TRIAMCINOLONE ACETONIDE 40 MG/ML
INJECTION, SUSPENSION INTRA-ARTICULAR; INTRAMUSCULAR
Status: DISPENSED
Start: 2022-01-07

## (undated) RX ORDER — LIDOCAINE HYDROCHLORIDE 10 MG/ML
INJECTION, SOLUTION INFILTRATION; PERINEURAL
Status: DISPENSED
Start: 2018-01-23

## (undated) RX ORDER — LIDOCAINE HYDROCHLORIDE 5 MG/ML
INJECTION, SOLUTION INFILTRATION; INTRAVENOUS
Status: DISPENSED
Start: 2017-12-15